# Patient Record
Sex: FEMALE
[De-identification: names, ages, dates, MRNs, and addresses within clinical notes are randomized per-mention and may not be internally consistent; named-entity substitution may affect disease eponyms.]

---

## 2021-04-20 ENCOUNTER — APPOINTMENT (OUTPATIENT)
Dept: OPHTHALMOLOGY | Facility: CLINIC | Age: 61
End: 2021-04-20

## 2022-03-15 ENCOUNTER — NON-APPOINTMENT (OUTPATIENT)
Age: 62
End: 2022-03-15

## 2022-03-15 ENCOUNTER — APPOINTMENT (OUTPATIENT)
Dept: OPHTHALMOLOGY | Facility: CLINIC | Age: 62
End: 2022-03-15
Payer: MEDICAID

## 2022-03-15 PROBLEM — Z00.00 ENCOUNTER FOR PREVENTIVE HEALTH EXAMINATION: Status: ACTIVE | Noted: 2022-03-15

## 2022-03-15 PROCEDURE — 92004 COMPRE OPH EXAM NEW PT 1/>: CPT

## 2022-03-15 PROCEDURE — 92020 GONIOSCOPY: CPT

## 2022-03-15 PROCEDURE — 92250 FUNDUS PHOTOGRAPHY W/I&R: CPT

## 2022-03-17 ENCOUNTER — APPOINTMENT (OUTPATIENT)
Dept: OPHTHALMOLOGY | Facility: CLINIC | Age: 62
End: 2022-03-17
Payer: MEDICAID

## 2022-03-17 ENCOUNTER — NON-APPOINTMENT (OUTPATIENT)
Age: 62
End: 2022-03-17

## 2022-03-17 PROCEDURE — 92133 CPTRZD OPH DX IMG PST SGM ON: CPT

## 2022-03-17 PROCEDURE — 92012 INTRM OPH EXAM EST PATIENT: CPT

## 2022-03-17 PROCEDURE — 76514 ECHO EXAM OF EYE THICKNESS: CPT

## 2022-03-17 PROCEDURE — 92083 EXTENDED VISUAL FIELD XM: CPT

## 2023-04-11 ENCOUNTER — APPOINTMENT (OUTPATIENT)
Dept: CARDIOLOGY | Facility: CLINIC | Age: 63
End: 2023-04-11

## 2023-04-24 ENCOUNTER — NON-APPOINTMENT (OUTPATIENT)
Age: 63
End: 2023-04-24

## 2023-04-24 ENCOUNTER — APPOINTMENT (OUTPATIENT)
Dept: CARDIOLOGY | Facility: CLINIC | Age: 63
End: 2023-04-24
Payer: MEDICAID

## 2023-04-24 VITALS
SYSTOLIC BLOOD PRESSURE: 151 MMHG | WEIGHT: 180.78 LBS | OXYGEN SATURATION: 99 % | DIASTOLIC BLOOD PRESSURE: 85 MMHG | HEART RATE: 82 BPM | BODY MASS INDEX: 30.12 KG/M2 | HEIGHT: 65 IN

## 2023-04-24 DIAGNOSIS — K21.9 GASTRO-ESOPHAGEAL REFLUX DISEASE W/OUT ESOPHAGITIS: ICD-10-CM

## 2023-04-24 DIAGNOSIS — Z82.49 FAMILY HISTORY OF ISCHEMIC HEART DISEASE AND OTHER DISEASES OF THE CIRCULATORY SYSTEM: ICD-10-CM

## 2023-04-24 DIAGNOSIS — R73.03 PREDIABETES.: ICD-10-CM

## 2023-04-24 DIAGNOSIS — Z78.9 OTHER SPECIFIED HEALTH STATUS: ICD-10-CM

## 2023-04-24 PROCEDURE — 99205 OFFICE O/P NEW HI 60 MIN: CPT | Mod: 25

## 2023-04-24 PROCEDURE — 93000 ELECTROCARDIOGRAM COMPLETE: CPT

## 2023-04-24 RX ORDER — ASPIRIN 81 MG/1
81 TABLET, CHEWABLE ORAL
Refills: 0 | Status: ACTIVE | COMMUNITY

## 2023-04-24 RX ORDER — CARVEDILOL 6.25 MG/1
6.25 TABLET, FILM COATED ORAL TWICE DAILY
Qty: 180 | Refills: 3 | Status: ACTIVE | COMMUNITY
Start: 2023-04-24 | End: 1900-01-01

## 2023-04-24 RX ORDER — PANTOPRAZOLE 40 MG/1
40 TABLET, DELAYED RELEASE ORAL
Qty: 30 | Refills: 1 | Status: ACTIVE | COMMUNITY

## 2023-04-24 RX ORDER — BISOPROLOL FUMARATE 5 MG/1
5 TABLET, FILM COATED ORAL
Refills: 0 | Status: COMPLETED | COMMUNITY
End: 2023-04-24

## 2023-06-03 ENCOUNTER — TRANSCRIPTION ENCOUNTER (OUTPATIENT)
Age: 63
End: 2023-06-03

## 2023-06-06 NOTE — HISTORY OF PRESENT ILLNESS
[FreeTextEntry1] : Ms. King id a 63 year-old woman with known CAD (LAD/RCA both PCI performed in [place]), HTN and HLD here to establish care.

## 2023-06-06 NOTE — DISCUSSION/SUMMARY
[FreeTextEntry1] : Plan:\par 1 Cardiac rehab referral\par 2. TTE - to establish baseline\par 3. determine exercise tolerance to ensure revasc was successful [EKG obtained to assist in diagnosis and management of assessed problem(s)] : EKG obtained to assist in diagnosis and management of assessed problem(s)

## 2023-07-25 ENCOUNTER — NON-APPOINTMENT (OUTPATIENT)
Age: 63
End: 2023-07-25

## 2023-07-25 ENCOUNTER — APPOINTMENT (OUTPATIENT)
Dept: CARDIOLOGY | Facility: CLINIC | Age: 63
End: 2023-07-25

## 2023-07-25 NOTE — HISTORY OF PRESENT ILLNESS
[Is Patient adherent with medication?] : patient is adherent with medication [Does patient monitor blood pressure at home?] : patient monitors blood pressure at home [Low sodium diet] : low sodium diet [Other diet strategies] : other diet strategies [Stress management techniques] : stress management techniques [Exercise] : exercise [Weight management] : weight management [Patient will verbalize factors contributing to improved blood pressure management] : Patient will verbalize factors contributing to improved blood pressure management [Patient will maintain blood pressure < 130/80 mmHg] : patient will maintain blood pressure < 130/80 mmHg [Medication Adherence] : medication adherence [Home monitoring of blood pressure] : home monitoring of blood pressure [Use of stress management techniques] : Use of stress management techniques [Diet changes including (healthy heart eating, less processed foods, low sodium diet)] : Diet changes including (healthy heart eating, less processed foods, low sodium diet) [Healthy weight (management maintenance)] : Healthy weight (management maintenance) [Halt the salt] : halt the salt [Guidelines for blood pressure management] : guidelines for blood pressure management [Define hypertension] : define hypertension [Signs and symptoms] : signs and symptoms [Role of lifestyle behaviors in blood pressure management] : role of lifestyle behaviors in blood pressure management [Medications] : medications [Exercise and blood pressure] : exercise and blood pressure [Food labels] : food labels [Sodium] : sodium [Processed food] : processed food [Height: ___] : Height: [unfilled] [Weight: ___ lbs] : Weight: [unfilled] lbs [Does patient monitor weight at home?] : Does patient monitor weight at home? Yes [Patient will lose 0.5 to 1 lb per week] : Patient will lose 0.5 to 1lb per week [Patient will weigh self daily] : patient will weigh self daily [Monitoring of weight at home and at cardiac rehabilitation] : Monitoring of weight at home and at cardiac rehabilitation [Individualized exercise program as developed at cardiac rehabilitation] : Individualized exercise program as developed at cardiac rehabilitation [Dietary strategies, including heart healthy eating, balanced with physical activity] : Dietary strategies, including heart healthy eating, balanced with physical activity [Patient will verbalize benefits of healthy weight management] : Patient will verbalize benefits of healthy weight management [Calories and healthy weight management] : Calories and healthy weight management [Exercise and diet] : exercise and diet [Weight gain and heart failure implications] : weight gain and heart failure implications [Role of lifestyle behaviors in weight management] : Role of lifestyle behaviors in weight management [Teach back utilized] : teach back utilized [Assessment] : Assessment [Action] : Action [Motivation/Desire] : motivation/desire [PCI/Stent] : PCI/stent [HLD] : HLD [Hypertension] : hypertension [Overweight/Obesity] : overweight/obesity [RPE: ____] : RPE: [unfilled]  [Cardiac Rehabilitation] : Cardiac Rehabilitation [___ Days per week] : [unfilled] days per week [>= 31 minutes per session] : >= 31 minutes per session [Target RPE: ___] : Target RPE: [unfilled] [Treadmill] : treadmill [Recumbent bike] : recumbent bike [Upright exercise bike] : upright exercise bike [BioStep] : BioStep [Elliptical] : elliptical [Upper body ergometer] : upper body ergometer [Stair Climber] : stair climber [Walking] : walking [Running] : running [Stretching/ROM exercises and balance exercises daily] : Stretching/ROM exercises and balance exercises daily [Will assess for musculoskeletal and other restrictions] : will assess for musculoskeletal and other restrictions [Angina with exercise] : no angina with exercise [Fall Risk] : no fall risk [FreeTextEntry6] : walking 40 mins per day [de-identified] : 7/25/23: Patient states that she is very active and walks approx 40-50 mins every day, [place] [Home] : at home, [unfilled] , at the time of the visit. [Verbal consent obtained from patient] : the patient, [unfilled] [Fully functional (bathing, dressing, toileting, transferring, walking, feeding)] : Fully functional (bathing, dressing, toileting, transferring, walking, feeding) [FreeTextEntry1] : Ms. King is a 63 year old female s/p PCI to both RCA and LAD on 2/16/23 in [place]. She has a pertinent history of HTN and HLD. States she was feeling well until February where she developed sudden onset midsternal chest pain with intractable vomiting while in the airport in [place]. Called EMS and was taken to hospital and emergently to cath lab for PCI to LAD via right radial approach. RCA was staged on 3/8/23.  Patient states she had an extensive CCU stay and initially struggled with hypotension post-procedure. LVEF was determined to be 40% while in [facility]. Patient then returned to the [facility] and saw Dr. Woodson to establish care. She states she is feeling well and is compliant with all of her medications. She is active and walks approximately 40-50 minutes per day, denies complaints of chest pain or dyspnea while exercising. She endorses a diet rich in whole grains and lean protein and demonstrates extensive knowledge regarding a heart healthy diet and the importance of staying active. 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 She is eager to begin cardiac rehab.

## 2023-07-25 NOTE — REVIEW OF SYSTEMS
[Fever] : no fever [Chills] : no chills [Fatigue] : no fatigue [Night Sweats] : no night sweats [Recent Change In Weight] : ~T no recent weight change [Vision Problems] : no vision problems [Hearing Loss] : no hearing loss [Chest Pain] : no chest pain [Palpitations] : no palpitations [Leg Claudication] : no leg claudication [Lower Ext Edema] : no lower extremity edema [Orthopnea] : no orthopnea [Paroxysmal Nocturnal Dyspnea] : no paroxysmal nocturnal dyspnea [Shortness Of Breath] : no shortness of breath [Wheezing] : no wheezing [Cough] : no cough [Dyspnea on Exertion] : no dyspnea on exertion [Abdominal Pain] : no abdominal pain [Vomiting] : no vomiting [Joint Pain] : no joint pain [Muscle Pain] : no muscle pain [Back Pain] : no back pain [Headache] : no headache [Dizziness] : no dizziness [Memory Loss] : no memory loss [Unsteady Walking] : no ataxia [Insomnia] : no insomnia [Anxiety] : no anxiety [Depression] : no depression

## 2023-07-25 NOTE — PLAN
[FreeTextEntry1] : Cardiac Rehabilitation Phase 2\par Focus assessment and physical exam at session #1\par ITP initiated- confer with Dr. Barros\par \par All questions answered. \par Welcome packet mailed.\par Start date: 11/8/23 11AM session\par We will reach out to patient if there is a sooner opening as she is eager to get started. \par

## 2023-07-25 NOTE — REASON FOR VISIT
[Assessment] : an assessment [FreeTextEntry1] : ITP to be reviewed and manually signed by Dr. Landis; ITP to be finalized at session #1; Clinical indication for cardiac rehabilitation: PCI [Intake Visit] : an intake visit

## 2023-07-25 NOTE — HISTORY OF PRESENT ILLNESS
[Is Patient adherent with medication?] : patient is adherent with medication [Does patient monitor blood pressure at home?] : patient monitors blood pressure at home [Low sodium diet] : low sodium diet [Other diet strategies] : other diet strategies [Stress management techniques] : stress management techniques [Exercise] : exercise [Weight management] : weight management [Patient will verbalize factors contributing to improved blood pressure management] : Patient will verbalize factors contributing to improved blood pressure management [Patient will maintain blood pressure < 130/80 mmHg] : patient will maintain blood pressure < 130/80 mmHg [Medication Adherence] : medication adherence [Home monitoring of blood pressure] : home monitoring of blood pressure [Use of stress management techniques] : Use of stress management techniques [Diet changes including (healthy heart eating, less processed foods, low sodium diet)] : Diet changes including (healthy heart eating, less processed foods, low sodium diet) [Healthy weight (management maintenance)] : Healthy weight (management maintenance) [Halt the salt] : halt the salt [Guidelines for blood pressure management] : guidelines for blood pressure management [Define hypertension] : define hypertension [Signs and symptoms] : signs and symptoms [Role of lifestyle behaviors in blood pressure management] : role of lifestyle behaviors in blood pressure management [Medications] : medications [Exercise and blood pressure] : exercise and blood pressure [Food labels] : food labels [Sodium] : sodium [Processed food] : processed food [Height: ___] : Height: [unfilled] [Weight: ___ lbs] : Weight: [unfilled] lbs [Does patient monitor weight at home?] : Does patient monitor weight at home? Yes [Patient will lose 0.5 to 1 lb per week] : Patient will lose 0.5 to 1lb per week [Patient will weigh self daily] : patient will weigh self daily [Monitoring of weight at home and at cardiac rehabilitation] : Monitoring of weight at home and at cardiac rehabilitation [Individualized exercise program as developed at cardiac rehabilitation] : Individualized exercise program as developed at cardiac rehabilitation [Dietary strategies, including heart healthy eating, balanced with physical activity] : Dietary strategies, including heart healthy eating, balanced with physical activity [Patient will verbalize benefits of healthy weight management] : Patient will verbalize benefits of healthy weight management [Calories and healthy weight management] : Calories and healthy weight management [Exercise and diet] : exercise and diet [Weight gain and heart failure implications] : weight gain and heart failure implications [Role of lifestyle behaviors in weight management] : Role of lifestyle behaviors in weight management [Teach back utilized] : teach back utilized [Assessment] : Assessment [Action] : Action [Motivation/Desire] : motivation/desire [PCI/Stent] : PCI/stent [HLD] : HLD [Hypertension] : hypertension [Overweight/Obesity] : overweight/obesity [RPE: ____] : RPE: [unfilled]  [Cardiac Rehabilitation] : Cardiac Rehabilitation [___ Days per week] : [unfilled] days per week [>= 31 minutes per session] : >= 31 minutes per session [Target RPE: ___] : Target RPE: [unfilled] [Treadmill] : treadmill [Recumbent bike] : recumbent bike [Upright exercise bike] : upright exercise bike [BioStep] : BioStep [Elliptical] : elliptical [Upper body ergometer] : upper body ergometer [Stair Climber] : stair climber [Walking] : walking [Running] : running [Stretching/ROM exercises and balance exercises daily] : Stretching/ROM exercises and balance exercises daily [Will assess for musculoskeletal and other restrictions] : will assess for musculoskeletal and other restrictions [Angina with exercise] : no angina with exercise [Fall Risk] : no fall risk [FreeTextEntry6] : walking 40 mins per day [de-identified] : 7/25/23: Patient states that she is very active and walks approx 40-50 mins every day, [place] [Home] : at home, [unfilled] , at the time of the visit. [Verbal consent obtained from patient] : the patient, [unfilled] [Fully functional (bathing, dressing, toileting, transferring, walking, feeding)] : Fully functional (bathing, dressing, toileting, transferring, walking, feeding) [FreeTextEntry1] : Ms. King is a 63 year old female s/p PCI to both RCA and LAD on 2/16/23 in [place]. She has a pertinent history of HTN and HLD. States she was feeling well until February where she developed sudden onset midsternal chest pain with intractable vomiting while in the airport in [place]. Called EMS and was taken to hospital and emergently to cath lab for PCI to LAD via right radial approach. RCA was staged on 3/8/23.  Patient states she had an extensive CCU stay and initially struggled with hypotension post-procedure. LVEF was determined to be 40% while in [facility]. Patient then returned to the [facility] and saw Dr. Woodson to establish care. She states she is feeling well and is compliant with all of her medications. She is active and walks approximately 40-50 minutes per day, denies complaints of chest pain or dyspnea while exercising. She endorses a diet rich in whole grains and lean protein and demonstrates extensive knowledge regarding a heart healthy diet and the importance of staying active. 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

## 2023-07-26 ENCOUNTER — APPOINTMENT (OUTPATIENT)
Dept: CARDIOLOGY | Facility: CLINIC | Age: 63
End: 2023-07-26
Payer: MEDICARE

## 2023-07-26 VITALS
DIASTOLIC BLOOD PRESSURE: 83 MMHG | HEIGHT: 65 IN | WEIGHT: 180.78 LBS | BODY MASS INDEX: 30.12 KG/M2 | SYSTOLIC BLOOD PRESSURE: 154 MMHG | HEART RATE: 67 BPM | OXYGEN SATURATION: 99 %

## 2023-07-26 LAB
ALBUMIN SERPL ELPH-MCNC: 4.5 G/DL
ALP BLD-CCNC: 77 U/L
ALT SERPL-CCNC: 16 U/L
ANION GAP SERPL CALC-SCNC: 13 MMOL/L
AST SERPL-CCNC: 17 U/L
BILIRUB SERPL-MCNC: 0.4 MG/DL
BUN SERPL-MCNC: 19 MG/DL
CALCIUM SERPL-MCNC: 9.5 MG/DL
CHLORIDE SERPL-SCNC: 104 MMOL/L
CHOLEST SERPL-MCNC: 101 MG/DL
CO2 SERPL-SCNC: 23 MMOL/L
CREAT SERPL-MCNC: 0.73 MG/DL
EGFR: 91 ML/MIN/1.73M2
ESTIMATED AVERAGE GLUCOSE: 148 MG/DL
GLUCOSE SERPL-MCNC: 126 MG/DL
HBA1C MFR BLD HPLC: 6.8 %
HDLC SERPL-MCNC: 54 MG/DL
LDLC SERPL CALC-MCNC: 28 MG/DL
NONHDLC SERPL-MCNC: 47 MG/DL
POTASSIUM SERPL-SCNC: 4.3 MMOL/L
PROT SERPL-MCNC: 7.6 G/DL
SODIUM SERPL-SCNC: 139 MMOL/L
TRIGL SERPL-MCNC: 101 MG/DL

## 2023-07-26 PROCEDURE — 99214 OFFICE O/P EST MOD 30 MIN: CPT

## 2023-07-26 PROCEDURE — 93000 ELECTROCARDIOGRAM COMPLETE: CPT

## 2023-07-26 RX ORDER — ROSUVASTATIN CALCIUM 20 MG/1
20 TABLET, FILM COATED ORAL
Qty: 90 | Refills: 3 | Status: ACTIVE | COMMUNITY
Start: 1900-01-01 | End: 1900-01-01

## 2023-07-26 RX ORDER — EPLERENONE 25 MG/1
25 TABLET, COATED ORAL
Qty: 90 | Refills: 3 | Status: ACTIVE | COMMUNITY
Start: 1900-01-01 | End: 1900-01-01

## 2023-07-26 RX ORDER — VALSARTAN 40 MG/1
40 TABLET, COATED ORAL DAILY
Qty: 45 | Refills: 3 | Status: ACTIVE | COMMUNITY
Start: 1900-01-01 | End: 1900-01-01

## 2023-07-27 ENCOUNTER — APPOINTMENT (OUTPATIENT)
Dept: OPHTHALMOLOGY | Facility: CLINIC | Age: 63
End: 2023-07-27

## 2023-08-28 ENCOUNTER — TRANSCRIPTION ENCOUNTER (OUTPATIENT)
Age: 63
End: 2023-08-28

## 2023-09-07 ENCOUNTER — APPOINTMENT (OUTPATIENT)
Dept: CARDIOLOGY | Facility: CLINIC | Age: 63
End: 2023-09-07
Payer: MEDICARE

## 2023-09-07 PROCEDURE — 93798 PHYS/QHP OP CAR RHAB W/ECG: CPT

## 2023-09-07 NOTE — REVIEW OF SYSTEMS
[Fever] : no fever [Chills] : no chills [Fatigue] : no fatigue [Night Sweats] : no night sweats [Recent Change In Weight] : ~T no recent weight change [Vision Problems] : no vision problems [Hearing Loss] : no hearing loss [Chest Pain] : no chest pain [Palpitations] : no palpitations [Leg Claudication] : no leg claudication [Lower Ext Edema] : no lower extremity edema [Orthopnea] : no orthopnea [Paroxysmal Nocturnal Dyspnea] : no paroxysmal nocturnal dyspnea [Shortness Of Breath] : no shortness of breath [Wheezing] : no wheezing [Cough] : no cough [Dyspnea on Exertion] : no dyspnea on exertion [Abdominal Pain] : no abdominal pain [Vomiting] : no vomiting [Joint Pain] : no joint pain [Muscle Pain] : no muscle pain [Back Pain] : no back pain [Itching] : no itching [Skin Rash] : no skin rash [Headache] : no headache [Dizziness] : no dizziness [Memory Loss] : no memory loss [Unsteady Walking] : no ataxia [Insomnia] : no insomnia [Anxiety] : no anxiety [Depression] : no depression [Easy Bruising] : no easy bruising

## 2023-09-07 NOTE — REASON FOR VISIT
[Assessment] : an assessment [FreeTextEntry1] : ITP to be reviewed and manually signed by Dr. Landis;; Clinical indication for cardiac rehabilitation: PCI [Intake Visit] : an intake visit

## 2023-09-07 NOTE — PLAN
[FreeTextEntry1] : Cardiac Rehabilitation Phase 2  ITP initiated- confer with Dr. Barros  All questions answered.   Will progress with new intensities and modalities as appropriate.

## 2023-09-07 NOTE — PHYSICAL EXAM
[No Acute Distress] : no acute distress [Well Nourished] : well nourished [Well-Appearing] : well-appearing [No JVD] : no jugular venous distention [Supple] : supple [No Respiratory Distress] : no respiratory distress  [No Accessory Muscle Use] : no accessory muscle use [Clear to Auscultation] : lungs were clear to auscultation bilaterally [Normal Rate] : normal rate  [Regular Rhythm] : with a regular rhythm [Normal S1, S2] : normal S1 and S2 [No Carotid Bruits] : no carotid bruits [No Edema] : there was no peripheral edema [No Extremity Clubbing/Cyanosis] : no extremity clubbing/cyanosis [Soft] : abdomen soft [Non Tender] : non-tender [Non-distended] : non-distended [Normal Bowel Sounds] : normal bowel sounds [Normal Anterior Cervical Nodes] : no anterior cervical lymphadenopathy [No CVA Tenderness] : no CVA  tenderness [No Spinal Tenderness] : no spinal tenderness [No Joint Swelling] : no joint swelling [Grossly Normal Strength/Tone] : grossly normal strength/tone [No Rash] : no rash [Coordination Grossly Intact] : coordination grossly intact [No Focal Deficits] : no focal deficits [Normal Gait] : normal gait [Speech Grossly Normal] : speech grossly normal [Normal Affect] : the affect was normal [Normal Insight/Judgement] : insight and judgment were intact

## 2023-09-07 NOTE — HISTORY OF PRESENT ILLNESS
[Does patient monitor blood pressure at home?] : patient monitors blood pressure at home [Low sodium diet] : low sodium diet [Other diet strategies] : other diet strategies [Stress management techniques] : stress management techniques [Exercise] : exercise [Weight management] : weight management [Patient will verbalize factors contributing to improved blood pressure management] : Patient will verbalize factors contributing to improved blood pressure management [Patient will maintain blood pressure < 130/80 mmHg] : patient will maintain blood pressure < 130/80 mmHg [Medication Adherence] : medication adherence [Home monitoring of blood pressure] : home monitoring of blood pressure [Use of stress management techniques] : Use of stress management techniques [Diet changes including (healthy heart eating, less processed foods, low sodium diet)] : Diet changes including (healthy heart eating, less processed foods, low sodium diet) [Healthy weight (management maintenance)] : Healthy weight (management maintenance) [Halt the salt] : halt the salt [Guidelines for blood pressure management] : guidelines for blood pressure management [Define hypertension] : define hypertension [Signs and symptoms] : signs and symptoms [Role of lifestyle behaviors in blood pressure management] : role of lifestyle behaviors in blood pressure management [Medications] : medications [Exercise and blood pressure] : exercise and blood pressure [Food labels] : food labels [Processed food] : processed food [Height: ___] : Height: [unfilled] [Weight: ___ lbs] : Weight: [unfilled] lbs [Does patient monitor weight at home?] : Does patient monitor weight at home? Yes [Patient will lose 0.5 to 1 lb per week] : Patient will lose 0.5 to 1lb per week [Patient will weigh self daily] : patient will weigh self daily [Monitoring of weight at home and at cardiac rehabilitation] : Monitoring of weight at home and at cardiac rehabilitation [Individualized exercise program as developed at cardiac rehabilitation] : Individualized exercise program as developed at cardiac rehabilitation [Patient will verbalize benefits of healthy weight management] : Patient will verbalize benefits of healthy weight management [Dietary strategies, including heart healthy eating, balanced with physical activity] : Dietary strategies, including heart healthy eating, balanced with physical activity [Calories and healthy weight management] : Calories and healthy weight management [Exercise and diet] : exercise and diet [Weight gain and heart failure implications] : weight gain and heart failure implications [Role of lifestyle behaviors in weight management] : Role of lifestyle behaviors in weight management [Motivation/Desire] : motivation/desire [PCI/Stent] : PCI/stent [HLD] : HLD [Overweight/Obesity] : overweight/obesity [Angina with exercise] : no angina with exercise [Fall Risk] : no fall risk [BP: ____ mmHg] : BP: [unfilled] mmHg [HR: ____ bpm] : BP: [unfilled] bpm [O2sat: ____ %] : O2sat: [unfilled] % [RPE: ____] : RPE: [unfilled]  [METS: ____] : METS: [unfilled]  [Cardiac Rehabilitation] : Cardiac Rehabilitation [___ Days per week] : [unfilled] days per week [>= 31 minutes per session] : >= 31 minutes per session [Target RPE: ___] : Target RPE: [unfilled] [Treadmill] : treadmill [Recumbent bike] : recumbent bike [Upright exercise bike] : upright exercise bike [BioStep] : BioStep [Elliptical] : elliptical [Upper body ergometer] : upper body ergometer [Stair Climber] : stair climber [Walking] : walking [Running] : running [Stretching/ROM exercises and balance exercises daily] : Stretching/ROM exercises and balance exercises daily [Will assess for musculoskeletal and other restrictions] : will assess for musculoskeletal and other restrictions [Perform aerobic activity for ___ consecutive minutes] : perform aerobic activity for [unfilled] consecutive minutes [To start resistance training] : to start resistance training [To return to my previous hobbies and activities] : to return to my previous hobbies and activities  [Equipment Orientation/Safety] : equipment orientation/safety [Exercise Benefits/Guidelines education] : exercise benefits/guidelines education [Individualized Exercise Rx] : individualized exercise Rx [Signs/Symptoms to report] : signs/symptoms to report [Hemodynamic responses] : hemodynamic responses [Home exercise] : home exercise [Patient verbalizes understanding of exercise education all questions answered] : Patient verbalizes understanding of exercise education all questions answered [Teach back utilized] : teach back utilized [Return demonstration] : return demonstration [de-identified] : 7/25/23: Patient states that she is very active and walks approx 40-50 mins every day.  9/7/23: Session 1- Tolerated biostep and recumbent bike. Hesitant to walk on TM, but will provide support and encouragement  RB METS- 2.9, BS METS 2.4 [PHQ-9: ___] : PHQ-9: [unfilled] [[facility]: ___] : [facility] total: [unfilled] [Feelings Score: ___] : Feelings Score: [unfilled] [Physical Fitness Score: ____] : Physical Fitness Score: [unfilled] [Daily Activities Score: ___] : Daily Activities Score: [unfilled] [Social Activities Score: ___] : Social Activities Score: [unfilled] [Pain Score: ___] : Pain Score: [unfilled] [Overall Health Score: ___] : Overall Health Score: [unfilled] [Change in Health Score: ___] : Change in Health Score: [unfilled] [Quality of Life Score: ___] : Quality of Life Score: [unfilled] [Social Support Score: ___] : Social Support Score: [unfilled] [Has the patient given CR team permission to speak with the emergency  about the patient?] : Has the patient given CR team permission to speak with the emergency  about the patient? Yes [Patient will include healthy emotional coping practices in everyday life, such as: ____] : Patient will include healthy emotional coping practices in everyday life, such as [unfilled] [Patient will increase use of healthy stress management techniques] : Patient will increase use of healthy stress management techniques [Breathing exercises] : breathing exercises [Self-reports of improved psychosocial well-being] : Self-reports of improved psychosocial well-being [Discuss overview of emotional health supportive modalities] : Discuss overview of emotional health supportive modalities [Mindfulness] : mindfulness [Relaxation breathing techniques] : relaxation breathing techniques [Stress management] : stress management [Community support] : community support [FreeTextEntry9] : Session 1: Patient seems in good spirits, nervous in general about exercising considering her recent MI. Provided with emotional support. States she has good support at home, and is happy with her cardiologist Dr. Woodson so far.  [Assessment] : Assessment [Action] : Action [Rate your plate score: ____] : Rate your plate score: [unfilled] [Hypertension] : Hypertension [Diabetes] : Diabetes [Heart Failure] : Heart Failure [Overweight] : Overweight [Sugar] : sugar [Sodium] : sodium [Cholesterol] : cholesterol [Trans fats/saturated fats] : trans fats/saturated fats [] : no [Is patient on medication for hyperlipidemia?] : Is patient on medication for hyperlipidemia? Yes [Is Patient adherent with medication?] : patient is adherent with medication [Self] : self [Significant other] : significant other [Family] : family [Patient will include healthy diet pattern approaches to healthy eating] : Patient will include healthy diet pattern approaches to healthy eating [Patient will commit to reducing or discontinuing alcohol use] : Patient will commit to reducing or discontinuing alcohol use [Self-reports of improved health eating patterns] : Self-reports of improved health eating patterns [Discuss an overview of healthy eating plan] : Discuss an overview of healthy eating plan [DASH Diet] : DASH diet [CareNotes written material provided] : CareNotes written material provided [Yes, continue with nutrition plan] : Yes, continue with nutrition plan [FreeTextEntry6] : rice; sodium content [FreeTextEntry7] : Will improve RYP score by end of CR [FreeTextEntry8] : 9/7/23: Session 1- Patient eats whole eggs frequently, will educate regarding the cholesterol content and see if she is interested in seeing a dietician to discuss egg alternatives [FreeTextEntry1] : Ms. King is a 63 year old female s/p PCI to both RCA and LAD on 2/16/23 in [place]. She has a pertinent history of HTN and HLD. States she was feeling well until February where she developed sudden onset midsternal chest pain with intractable vomiting while in the airport in [place]. Called EMS and was taken to hospital and emergently to cath lab for PCI to LAD via right radial approach. RCA was staged on 3/8/23.  Patient states she had an extensive CCU stay and initially struggled with hypotension post-procedure. LVEF was determined to be 40% while in [facility]. Patient then returned to the [facility] and saw Dr. Woodson to establish care. She states she is feeling well and is compliant with all of her medications. She is active and walks approximately 40-50 minutes per day, denies complaints of chest pain or dyspnea while exercising. She endorses a diet rich in whole grains and lean protein and demonstrates extensive knowledge regarding a heart healthy diet and the importance of staying active. 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 She is eager to begin cardiac rehab.   9/7/23 Session 1: Patient presents today for first CR session, states she is feeling well and is without focal complaints. Patient nervous to begin exercise in light of her recent MI, given support and encouragement. Patient  tolerated all introduced modalities well and without complaints.  [Fully functional (bathing, dressing, toileting, transferring, walking, feeding)] : Fully functional (bathing, dressing, toileting, transferring, walking, feeding)

## 2023-09-07 NOTE — HISTORY OF PRESENT ILLNESS
[Does patient monitor blood pressure at home?] : patient monitors blood pressure at home [Low sodium diet] : low sodium diet [Other diet strategies] : other diet strategies [Stress management techniques] : stress management techniques [Exercise] : exercise [Weight management] : weight management [Patient will verbalize factors contributing to improved blood pressure management] : Patient will verbalize factors contributing to improved blood pressure management [Patient will maintain blood pressure < 130/80 mmHg] : patient will maintain blood pressure < 130/80 mmHg [Medication Adherence] : medication adherence [Home monitoring of blood pressure] : home monitoring of blood pressure [Use of stress management techniques] : Use of stress management techniques [Diet changes including (healthy heart eating, less processed foods, low sodium diet)] : Diet changes including (healthy heart eating, less processed foods, low sodium diet) [Healthy weight (management maintenance)] : Healthy weight (management maintenance) [Halt the salt] : halt the salt [Guidelines for blood pressure management] : guidelines for blood pressure management [Define hypertension] : define hypertension [Signs and symptoms] : signs and symptoms [Role of lifestyle behaviors in blood pressure management] : role of lifestyle behaviors in blood pressure management [Medications] : medications [Exercise and blood pressure] : exercise and blood pressure [Food labels] : food labels [Processed food] : processed food [Height: ___] : Height: [unfilled] [Weight: ___ lbs] : Weight: [unfilled] lbs [Does patient monitor weight at home?] : Does patient monitor weight at home? Yes [Patient will lose 0.5 to 1 lb per week] : Patient will lose 0.5 to 1lb per week [Patient will weigh self daily] : patient will weigh self daily [Monitoring of weight at home and at cardiac rehabilitation] : Monitoring of weight at home and at cardiac rehabilitation [Individualized exercise program as developed at cardiac rehabilitation] : Individualized exercise program as developed at cardiac rehabilitation [Patient will verbalize benefits of healthy weight management] : Patient will verbalize benefits of healthy weight management [Dietary strategies, including heart healthy eating, balanced with physical activity] : Dietary strategies, including heart healthy eating, balanced with physical activity [Calories and healthy weight management] : Calories and healthy weight management [Exercise and diet] : exercise and diet [Weight gain and heart failure implications] : weight gain and heart failure implications [Role of lifestyle behaviors in weight management] : Role of lifestyle behaviors in weight management [Motivation/Desire] : motivation/desire [PCI/Stent] : PCI/stent [HLD] : HLD [Overweight/Obesity] : overweight/obesity [Angina with exercise] : no angina with exercise [Fall Risk] : no fall risk [BP: ____ mmHg] : BP: [unfilled] mmHg [HR: ____ bpm] : BP: [unfilled] bpm [O2sat: ____ %] : O2sat: [unfilled] % [RPE: ____] : RPE: [unfilled]  [METS: ____] : METS: [unfilled]  [Cardiac Rehabilitation] : Cardiac Rehabilitation [___ Days per week] : [unfilled] days per week [>= 31 minutes per session] : >= 31 minutes per session [Target RPE: ___] : Target RPE: [unfilled] [Treadmill] : treadmill [Recumbent bike] : recumbent bike [Upright exercise bike] : upright exercise bike [BioStep] : BioStep [Elliptical] : elliptical [Upper body ergometer] : upper body ergometer [Stair Climber] : stair climber [Walking] : walking [Running] : running [Stretching/ROM exercises and balance exercises daily] : Stretching/ROM exercises and balance exercises daily [Will assess for musculoskeletal and other restrictions] : will assess for musculoskeletal and other restrictions [Perform aerobic activity for ___ consecutive minutes] : perform aerobic activity for [unfilled] consecutive minutes [To start resistance training] : to start resistance training [To return to my previous hobbies and activities] : to return to my previous hobbies and activities  [Equipment Orientation/Safety] : equipment orientation/safety [Exercise Benefits/Guidelines education] : exercise benefits/guidelines education [Individualized Exercise Rx] : individualized exercise Rx [Signs/Symptoms to report] : signs/symptoms to report [Hemodynamic responses] : hemodynamic responses [Home exercise] : home exercise [Patient verbalizes understanding of exercise education all questions answered] : Patient verbalizes understanding of exercise education all questions answered [Teach back utilized] : teach back utilized [Return demonstration] : return demonstration [de-identified] : 7/25/23: Patient states that she is very active and walks approx 40-50 mins every day.  9/7/23: Session 1- Tolerated biostep and recumbent bike. Hesitant to walk on TM, but will provide support and encouragement  RB METS- 2.9, BS METS 2.4 [PHQ-9: ___] : PHQ-9: [unfilled] [[facility]: [facility]] : [facility] total: [unfilled] [Feelings Score: ___] : Feelings Score: [unfilled] [Physical Fitness Score: ____] : Physical Fitness Score: [unfilled] [Daily Activities Score: ___] : Daily Activities Score: [unfilled] [Social Activities Score: ___] : Social Activities Score: [unfilled] [Pain Score: ___] : Pain Score: [unfilled] [Overall Health Score: ___] : Overall Health Score: [unfilled] [Change in Health Score: ___] : Change in Health Score: [unfilled] [Quality of Life Score: ___] : Quality of Life Score: [unfilled] [Social Support Score: ___] : Social Support Score: [unfilled] [Has the patient given CR team permission to speak with the emergency  about the patient?] : Has the patient given CR team permission to speak with the emergency  about the patient? Yes [Patient will include healthy emotional coping practices in everyday life, such as: ____] : Patient will include healthy emotional coping practices in everyday life, such as [unfilled] [Patient will increase use of healthy stress management techniques] : Patient will increase use of healthy stress management techniques [Breathing exercises] : breathing exercises [Self-reports of improved psychosocial well-being] : Self-reports of improved psychosocial well-being [Discuss overview of emotional health supportive modalities] : Discuss overview of emotional health supportive modalities [Mindfulness] : mindfulness [Relaxation breathing techniques] : relaxation breathing techniques [Stress management] : stress management [Community support] : community support [FreeTextEntry9] : Session 1: Patient seems in good spirits, nervous in general about exercising considering her recent MI. Provided with emotional support. States she has good support at home, and is happy with her cardiologist Dr. Woodson so far.  [Assessment] : Assessment [Action] : Action [Rate your plate score: ____] : Rate your plate score: [unfilled] [Hypertension] : Hypertension [Diabetes] : Diabetes [Heart Failure] : Heart Failure [Overweight] : Overweight [Sugar] : sugar [Sodium] : sodium [Cholesterol] : cholesterol [Trans fats/saturated fats] : trans fats/saturated fats [] : no [Is patient on medication for hyperlipidemia?] : Is patient on medication for hyperlipidemia? Yes [Is Patient adherent with medication?] : patient is adherent with medication [Self] : self [Significant other] : significant other [Family] : family [Patient will include healthy diet pattern approaches to healthy eating] : Patient will include healthy diet pattern approaches to healthy eating [Patient will commit to reducing or discontinuing alcohol use] : Patient will commit to reducing or discontinuing alcohol use [Self-reports of improved health eating patterns] : Self-reports of improved health eating patterns [Discuss an overview of healthy eating plan] : Discuss an overview of healthy eating plan [DASH Diet] : DASH diet [CareNotes written material provided] : CareNotes written material provided [Yes, continue with nutrition plan] : Yes, continue with nutrition plan [FreeTextEntry6] : rice; sodium content [FreeTextEntry7] : Will improve RYP score by end of CR [FreeTextEntry8] : 9/7/23: Session 1- Patient eats whole eggs frequently, will educate regarding the cholesterol content and see if she is interested in seeing a dietician to discuss egg alternatives [FreeTextEntry1] : Ms. King is a 63 year old female s/p PCI to both RCA and LAD on 2/16/23 in [place]. She has a pertinent history of HTN and HLD. States she was feeling well until February where she developed sudden onset midsternal chest pain with intractable vomiting while in the airport in [place]. Called EMS and was taken to hospital and emergently to cath lab for PCI to LAD via right radial approach. RCA was staged on 3/8/23.  Patient states she had an extensive CCU stay and initially struggled with hypotension post-procedure. LVEF was determined to be 40% while in [facility]. Patient then returned to the [facility] and saw Dr. Woodson to establish care. She states she is feeling well and is compliant with all of her medications. She is active and walks approximately 40-50 minutes per day, denies complaints of chest pain or dyspnea while exercising. She endorses a diet rich in whole grains and lean protein and demonstrates extensive knowledge regarding a heart healthy diet and the importance of staying active. 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   9/7/23 Session 1: Patient presents today for first CR session, states she is feeling well and is without focal complaints. Patient nervous to begin exercise in light of her recent MI, given support and encouragement. Patient  tolerated all introduced modalities well and without complaints.  [Fully functional (bathing, dressing, toileting, transferring, walking, feeding)] : Fully functional (bathing, dressing, toileting, transferring, walking, feeding)

## 2023-09-11 ENCOUNTER — APPOINTMENT (OUTPATIENT)
Dept: CARDIOLOGY | Facility: CLINIC | Age: 63
End: 2023-09-11
Payer: MEDICARE

## 2023-09-11 PROCEDURE — 93798 PHYS/QHP OP CAR RHAB W/ECG: CPT

## 2023-09-13 ENCOUNTER — APPOINTMENT (OUTPATIENT)
Dept: CARDIOLOGY | Facility: CLINIC | Age: 63
End: 2023-09-13
Payer: MEDICARE

## 2023-09-13 PROCEDURE — 93797 PHYS/QHP OP CAR RHAB WO ECG: CPT

## 2023-09-14 ENCOUNTER — APPOINTMENT (OUTPATIENT)
Dept: CARDIOLOGY | Facility: CLINIC | Age: 63
End: 2023-09-14
Payer: MEDICARE

## 2023-09-14 PROCEDURE — 93797 PHYS/QHP OP CAR RHAB WO ECG: CPT

## 2023-09-18 ENCOUNTER — APPOINTMENT (OUTPATIENT)
Dept: CARDIOLOGY | Facility: CLINIC | Age: 63
End: 2023-09-18
Payer: MEDICARE

## 2023-09-18 PROCEDURE — 93797 PHYS/QHP OP CAR RHAB WO ECG: CPT

## 2023-09-20 ENCOUNTER — APPOINTMENT (OUTPATIENT)
Dept: CARDIOLOGY | Facility: CLINIC | Age: 63
End: 2023-09-20
Payer: MEDICARE

## 2023-09-20 PROCEDURE — 93797 PHYS/QHP OP CAR RHAB WO ECG: CPT

## 2023-09-21 ENCOUNTER — APPOINTMENT (OUTPATIENT)
Dept: CARDIOLOGY | Facility: CLINIC | Age: 63
End: 2023-09-21
Payer: MEDICARE

## 2023-09-21 PROCEDURE — 93797 PHYS/QHP OP CAR RHAB WO ECG: CPT

## 2023-09-25 ENCOUNTER — APPOINTMENT (OUTPATIENT)
Dept: CARDIOLOGY | Facility: CLINIC | Age: 63
End: 2023-09-25
Payer: MEDICARE

## 2023-09-25 PROCEDURE — 93797 PHYS/QHP OP CAR RHAB WO ECG: CPT

## 2023-09-27 ENCOUNTER — APPOINTMENT (OUTPATIENT)
Dept: CARDIOLOGY | Facility: CLINIC | Age: 63
End: 2023-09-27
Payer: MEDICARE

## 2023-09-27 PROCEDURE — 93797 PHYS/QHP OP CAR RHAB WO ECG: CPT

## 2023-09-28 ENCOUNTER — APPOINTMENT (OUTPATIENT)
Dept: CARDIOLOGY | Facility: CLINIC | Age: 63
End: 2023-09-28
Payer: MEDICARE

## 2023-09-28 PROCEDURE — 93797 PHYS/QHP OP CAR RHAB WO ECG: CPT

## 2023-10-02 ENCOUNTER — APPOINTMENT (OUTPATIENT)
Dept: CARDIOLOGY | Facility: CLINIC | Age: 63
End: 2023-10-02
Payer: MEDICARE

## 2023-10-02 PROCEDURE — 93797 PHYS/QHP OP CAR RHAB WO ECG: CPT

## 2023-10-04 ENCOUNTER — APPOINTMENT (OUTPATIENT)
Dept: CARDIOLOGY | Facility: CLINIC | Age: 63
End: 2023-10-04
Payer: MEDICARE

## 2023-10-04 PROCEDURE — 93797 PHYS/QHP OP CAR RHAB WO ECG: CPT

## 2023-10-05 ENCOUNTER — APPOINTMENT (OUTPATIENT)
Dept: CARDIOLOGY | Facility: CLINIC | Age: 63
End: 2023-10-05
Payer: MEDICARE

## 2023-10-05 PROCEDURE — 93797 PHYS/QHP OP CAR RHAB WO ECG: CPT

## 2023-10-07 ENCOUNTER — NON-APPOINTMENT (OUTPATIENT)
Age: 63
End: 2023-10-07

## 2023-10-09 ENCOUNTER — APPOINTMENT (OUTPATIENT)
Dept: CARDIOLOGY | Facility: CLINIC | Age: 63
End: 2023-10-09
Payer: MEDICARE

## 2023-10-09 PROCEDURE — 93798 PHYS/QHP OP CAR RHAB W/ECG: CPT

## 2023-10-11 ENCOUNTER — APPOINTMENT (OUTPATIENT)
Dept: CARDIOLOGY | Facility: CLINIC | Age: 63
End: 2023-10-11
Payer: MEDICARE

## 2023-10-11 PROCEDURE — 93797 PHYS/QHP OP CAR RHAB WO ECG: CPT

## 2023-10-12 ENCOUNTER — APPOINTMENT (OUTPATIENT)
Dept: CARDIOLOGY | Facility: CLINIC | Age: 63
End: 2023-10-12
Payer: MEDICARE

## 2023-10-12 PROCEDURE — 93797 PHYS/QHP OP CAR RHAB WO ECG: CPT

## 2023-10-16 ENCOUNTER — APPOINTMENT (OUTPATIENT)
Dept: CARDIOLOGY | Facility: CLINIC | Age: 63
End: 2023-10-16
Payer: MEDICARE

## 2023-10-16 PROCEDURE — 93797 PHYS/QHP OP CAR RHAB WO ECG: CPT

## 2023-10-18 ENCOUNTER — APPOINTMENT (OUTPATIENT)
Dept: CARDIOLOGY | Facility: CLINIC | Age: 63
End: 2023-10-18
Payer: MEDICARE

## 2023-10-18 PROCEDURE — 93797 PHYS/QHP OP CAR RHAB WO ECG: CPT

## 2023-10-19 ENCOUNTER — APPOINTMENT (OUTPATIENT)
Dept: CARDIOLOGY | Facility: CLINIC | Age: 63
End: 2023-10-19
Payer: MEDICARE

## 2023-10-19 PROCEDURE — 93797 PHYS/QHP OP CAR RHAB WO ECG: CPT

## 2023-10-23 ENCOUNTER — APPOINTMENT (OUTPATIENT)
Dept: CARDIOLOGY | Facility: CLINIC | Age: 63
End: 2023-10-23
Payer: MEDICARE

## 2023-10-23 PROCEDURE — 93797 PHYS/QHP OP CAR RHAB WO ECG: CPT

## 2023-10-25 ENCOUNTER — APPOINTMENT (OUTPATIENT)
Dept: CARDIOLOGY | Facility: CLINIC | Age: 63
End: 2023-10-25
Payer: MEDICARE

## 2023-10-25 PROCEDURE — 93797 PHYS/QHP OP CAR RHAB WO ECG: CPT

## 2023-10-26 ENCOUNTER — APPOINTMENT (OUTPATIENT)
Dept: CARDIOLOGY | Facility: CLINIC | Age: 63
End: 2023-10-26
Payer: MEDICARE

## 2023-10-26 PROCEDURE — 93797 PHYS/QHP OP CAR RHAB WO ECG: CPT

## 2023-10-30 ENCOUNTER — APPOINTMENT (OUTPATIENT)
Dept: CARDIOLOGY | Facility: CLINIC | Age: 63
End: 2023-10-30
Payer: MEDICARE

## 2023-10-30 PROCEDURE — 93797 PHYS/QHP OP CAR RHAB WO ECG: CPT

## 2023-11-01 ENCOUNTER — APPOINTMENT (OUTPATIENT)
Dept: CARDIOLOGY | Facility: CLINIC | Age: 63
End: 2023-11-01
Payer: MEDICARE

## 2023-11-01 PROCEDURE — 93797 PHYS/QHP OP CAR RHAB WO ECG: CPT

## 2023-11-02 ENCOUNTER — APPOINTMENT (OUTPATIENT)
Dept: CARDIOLOGY | Facility: CLINIC | Age: 63
End: 2023-11-02
Payer: MEDICARE

## 2023-11-02 PROCEDURE — 93797 PHYS/QHP OP CAR RHAB WO ECG: CPT

## 2023-11-06 ENCOUNTER — NON-APPOINTMENT (OUTPATIENT)
Age: 63
End: 2023-11-06

## 2023-11-06 ENCOUNTER — APPOINTMENT (OUTPATIENT)
Dept: CARDIOLOGY | Facility: CLINIC | Age: 63
End: 2023-11-06
Payer: MEDICARE

## 2023-11-06 PROCEDURE — 93797 PHYS/QHP OP CAR RHAB WO ECG: CPT

## 2023-11-08 ENCOUNTER — APPOINTMENT (OUTPATIENT)
Dept: CARDIOLOGY | Facility: CLINIC | Age: 63
End: 2023-11-08
Payer: MEDICARE

## 2023-11-08 PROCEDURE — 93797 PHYS/QHP OP CAR RHAB WO ECG: CPT

## 2023-11-09 ENCOUNTER — APPOINTMENT (OUTPATIENT)
Dept: CARDIOLOGY | Facility: CLINIC | Age: 63
End: 2023-11-09
Payer: MEDICARE

## 2023-11-09 PROCEDURE — 93797 PHYS/QHP OP CAR RHAB WO ECG: CPT

## 2023-11-13 ENCOUNTER — APPOINTMENT (OUTPATIENT)
Dept: CARDIOLOGY | Facility: CLINIC | Age: 63
End: 2023-11-13
Payer: MEDICARE

## 2023-11-13 PROCEDURE — 93797 PHYS/QHP OP CAR RHAB WO ECG: CPT

## 2023-11-15 ENCOUNTER — APPOINTMENT (OUTPATIENT)
Dept: CARDIOLOGY | Facility: CLINIC | Age: 63
End: 2023-11-15
Payer: MEDICARE

## 2023-11-15 VITALS
WEIGHT: 176 LBS | DIASTOLIC BLOOD PRESSURE: 83 MMHG | BODY MASS INDEX: 29.32 KG/M2 | SYSTOLIC BLOOD PRESSURE: 122 MMHG | OXYGEN SATURATION: 98 % | HEART RATE: 65 BPM | HEIGHT: 65 IN

## 2023-11-15 PROCEDURE — 99214 OFFICE O/P EST MOD 30 MIN: CPT | Mod: 25

## 2023-11-15 PROCEDURE — 93000 ELECTROCARDIOGRAM COMPLETE: CPT

## 2023-11-20 ENCOUNTER — APPOINTMENT (OUTPATIENT)
Dept: CARDIOLOGY | Facility: CLINIC | Age: 63
End: 2023-11-20
Payer: MEDICARE

## 2023-11-20 PROCEDURE — 93797 PHYS/QHP OP CAR RHAB WO ECG: CPT

## 2023-11-22 ENCOUNTER — APPOINTMENT (OUTPATIENT)
Dept: CARDIOLOGY | Facility: CLINIC | Age: 63
End: 2023-11-22
Payer: MEDICARE

## 2023-11-22 PROCEDURE — 93797 PHYS/QHP OP CAR RHAB WO ECG: CPT

## 2023-11-27 ENCOUNTER — APPOINTMENT (OUTPATIENT)
Dept: CARDIOLOGY | Facility: CLINIC | Age: 63
End: 2023-11-27
Payer: MEDICARE

## 2023-11-27 PROCEDURE — 93797 PHYS/QHP OP CAR RHAB WO ECG: CPT

## 2023-11-29 ENCOUNTER — APPOINTMENT (OUTPATIENT)
Dept: CARDIOLOGY | Facility: CLINIC | Age: 63
End: 2023-11-29
Payer: MEDICARE

## 2023-11-29 PROCEDURE — 93797 PHYS/QHP OP CAR RHAB WO ECG: CPT

## 2023-11-30 ENCOUNTER — APPOINTMENT (OUTPATIENT)
Dept: CARDIOLOGY | Facility: CLINIC | Age: 63
End: 2023-11-30
Payer: MEDICARE

## 2023-11-30 PROCEDURE — 93797 PHYS/QHP OP CAR RHAB WO ECG: CPT

## 2023-12-04 ENCOUNTER — APPOINTMENT (OUTPATIENT)
Dept: CARDIOLOGY | Facility: CLINIC | Age: 63
End: 2023-12-04
Payer: MEDICARE

## 2023-12-04 PROCEDURE — 93797 PHYS/QHP OP CAR RHAB WO ECG: CPT

## 2023-12-05 ENCOUNTER — APPOINTMENT (OUTPATIENT)
Dept: OPHTHALMOLOGY | Facility: CLINIC | Age: 63
End: 2023-12-05
Payer: MEDICARE

## 2023-12-05 ENCOUNTER — NON-APPOINTMENT (OUTPATIENT)
Age: 63
End: 2023-12-05

## 2023-12-05 PROCEDURE — 92133 CPTRZD OPH DX IMG PST SGM ON: CPT

## 2023-12-05 PROCEDURE — 92014 COMPRE OPH EXAM EST PT 1/>: CPT | Mod: 25

## 2023-12-06 ENCOUNTER — APPOINTMENT (OUTPATIENT)
Dept: CARDIOLOGY | Facility: CLINIC | Age: 63
End: 2023-12-06
Payer: MEDICARE

## 2023-12-06 PROCEDURE — 93797 PHYS/QHP OP CAR RHAB WO ECG: CPT

## 2024-01-24 ENCOUNTER — APPOINTMENT (OUTPATIENT)
Dept: CARDIOLOGY | Facility: CLINIC | Age: 64
End: 2024-01-24

## 2024-02-26 ENCOUNTER — APPOINTMENT (OUTPATIENT)
Dept: CARDIOLOGY | Facility: CLINIC | Age: 64
End: 2024-02-26
Payer: MEDICARE

## 2024-02-26 VITALS
HEART RATE: 63 BPM | BODY MASS INDEX: 29.75 KG/M2 | OXYGEN SATURATION: 98 % | HEIGHT: 65 IN | DIASTOLIC BLOOD PRESSURE: 75 MMHG | SYSTOLIC BLOOD PRESSURE: 125 MMHG | WEIGHT: 178.57 LBS

## 2024-02-26 PROCEDURE — 93000 ELECTROCARDIOGRAM COMPLETE: CPT

## 2024-02-26 PROCEDURE — G2211 COMPLEX E/M VISIT ADD ON: CPT | Mod: NC,1L

## 2024-02-26 PROCEDURE — 99214 OFFICE O/P EST MOD 30 MIN: CPT | Mod: 25

## 2024-02-28 RX ORDER — DAPAGLIFLOZIN 5 MG/1
5 TABLET, FILM COATED ORAL
Qty: 90 | Refills: 1 | Status: ACTIVE | COMMUNITY
Start: 1900-01-01 | End: 1900-01-01

## 2024-03-16 ENCOUNTER — APPOINTMENT (OUTPATIENT)
Dept: CARDIOLOGY | Facility: CLINIC | Age: 64
End: 2024-03-16
Payer: MEDICARE

## 2024-03-16 PROCEDURE — 93306 TTE W/DOPPLER COMPLETE: CPT

## 2024-05-29 ENCOUNTER — APPOINTMENT (OUTPATIENT)
Dept: CARDIOLOGY | Facility: CLINIC | Age: 64
End: 2024-05-29
Payer: MEDICARE

## 2024-05-29 VITALS — DIASTOLIC BLOOD PRESSURE: 93 MMHG | OXYGEN SATURATION: 97 % | SYSTOLIC BLOOD PRESSURE: 153 MMHG | HEART RATE: 67 BPM

## 2024-05-29 DIAGNOSIS — I25.10 ATHEROSCLEROTIC HEART DISEASE OF NATIVE CORONARY ARTERY W/OUT ANGINA PECTORIS: ICD-10-CM

## 2024-05-29 DIAGNOSIS — I10 ESSENTIAL (PRIMARY) HYPERTENSION: ICD-10-CM

## 2024-05-29 DIAGNOSIS — R07.9 CHEST PAIN, UNSPECIFIED: ICD-10-CM

## 2024-05-29 PROCEDURE — 93000 ELECTROCARDIOGRAM COMPLETE: CPT

## 2024-05-29 PROCEDURE — 99214 OFFICE O/P EST MOD 30 MIN: CPT | Mod: 25

## 2024-05-29 PROCEDURE — G2211 COMPLEX E/M VISIT ADD ON: CPT | Mod: NC

## 2024-05-30 LAB
ALBUMIN SERPL ELPH-MCNC: 4.3 G/DL
ALP BLD-CCNC: 83 U/L
ALT SERPL-CCNC: 16 U/L
ANION GAP SERPL CALC-SCNC: 14 MMOL/L
AST SERPL-CCNC: 16 U/L
BILIRUB SERPL-MCNC: 0.3 MG/DL
BUN SERPL-MCNC: 24 MG/DL
CALCIUM SERPL-MCNC: 9.3 MG/DL
CHLORIDE SERPL-SCNC: 104 MMOL/L
CO2 SERPL-SCNC: 20 MMOL/L
CREAT SERPL-MCNC: 0.75 MG/DL
EGFR: 88 ML/MIN/1.73M2
ESTIMATED AVERAGE GLUCOSE: 137 MG/DL
GLUCOSE SERPL-MCNC: 165 MG/DL
HBA1C MFR BLD HPLC: 6.4 %
HCT VFR BLD CALC: 40.8 %
HGB BLD-MCNC: 13.1 G/DL
MCHC RBC-ENTMCNC: 28 PG
MCHC RBC-ENTMCNC: 32.1 GM/DL
MCV RBC AUTO: 87.2 FL
PLATELET # BLD AUTO: 188 K/UL
POTASSIUM SERPL-SCNC: 4.4 MMOL/L
PROT SERPL-MCNC: 7.5 G/DL
RBC # BLD: 4.68 M/UL
RBC # FLD: 13.4 %
SODIUM SERPL-SCNC: 139 MMOL/L
WBC # FLD AUTO: 6.64 K/UL

## 2024-06-14 PROBLEM — I10 HYPERTENSION, UNSPECIFIED TYPE: Status: ACTIVE | Noted: 2023-04-24

## 2024-06-14 PROBLEM — R07.9 CHEST PAIN: Status: ACTIVE | Noted: 2023-04-24

## 2024-08-24 ENCOUNTER — INPATIENT (INPATIENT)
Facility: HOSPITAL | Age: 64
LOS: 1 days | Discharge: HOME | DRG: 93 | End: 2024-08-26
Attending: INTERNAL MEDICINE | Admitting: INTERNAL MEDICINE
Payer: COMMERCIAL

## 2024-08-24 VITALS
RESPIRATION RATE: 20 BRPM | DIASTOLIC BLOOD PRESSURE: 86 MMHG | TEMPERATURE: 98 F | HEIGHT: 65 IN | OXYGEN SATURATION: 98 % | SYSTOLIC BLOOD PRESSURE: 176 MMHG | WEIGHT: 176.37 LBS | HEART RATE: 67 BPM

## 2024-08-24 DIAGNOSIS — R26.81 UNSTEADINESS ON FEET: ICD-10-CM

## 2024-08-24 LAB
ALBUMIN SERPL ELPH-MCNC: 4.3 G/DL — SIGNIFICANT CHANGE UP (ref 3.3–5)
ALP SERPL-CCNC: 80 U/L — SIGNIFICANT CHANGE UP (ref 40–120)
ALT FLD-CCNC: 19 U/L — SIGNIFICANT CHANGE UP (ref 10–45)
ANION GAP SERPL CALC-SCNC: 10 MMOL/L — SIGNIFICANT CHANGE UP (ref 5–17)
APTT BLD: 32 SEC — SIGNIFICANT CHANGE UP (ref 24.5–35.6)
AST SERPL-CCNC: 29 U/L — SIGNIFICANT CHANGE UP (ref 10–40)
BASOPHILS # BLD AUTO: 0.06 K/UL — SIGNIFICANT CHANGE UP (ref 0–0.2)
BASOPHILS NFR BLD AUTO: 0.7 % — SIGNIFICANT CHANGE UP (ref 0–2)
BILIRUB SERPL-MCNC: 0.4 MG/DL — SIGNIFICANT CHANGE UP (ref 0.2–1.2)
BUN SERPL-MCNC: 23 MG/DL — SIGNIFICANT CHANGE UP (ref 7–23)
CALCIUM SERPL-MCNC: 9.5 MG/DL — SIGNIFICANT CHANGE UP (ref 8.4–10.5)
CHLORIDE SERPL-SCNC: 106 MMOL/L — SIGNIFICANT CHANGE UP (ref 96–108)
CO2 SERPL-SCNC: 21 MMOL/L — LOW (ref 22–31)
CREAT SERPL-MCNC: 0.71 MG/DL — SIGNIFICANT CHANGE UP (ref 0.5–1.3)
EGFR: 94 ML/MIN/1.73M2 — SIGNIFICANT CHANGE UP
EOSINOPHIL # BLD AUTO: 0.04 K/UL — SIGNIFICANT CHANGE UP (ref 0–0.5)
EOSINOPHIL NFR BLD AUTO: 0.5 % — SIGNIFICANT CHANGE UP (ref 0–6)
GLUCOSE BLDC GLUCOMTR-MCNC: 110 MG/DL — HIGH (ref 70–99)
GLUCOSE SERPL-MCNC: 136 MG/DL — HIGH (ref 70–99)
HCT VFR BLD CALC: 45.4 % — HIGH (ref 34.5–45)
HGB BLD-MCNC: 14.2 G/DL — SIGNIFICANT CHANGE UP (ref 11.5–15.5)
IMM GRANULOCYTES NFR BLD AUTO: 0.2 % — SIGNIFICANT CHANGE UP (ref 0–0.9)
INR BLD: 1.06 RATIO — SIGNIFICANT CHANGE UP (ref 0.85–1.18)
LYMPHOCYTES # BLD AUTO: 3.47 K/UL — HIGH (ref 1–3.3)
LYMPHOCYTES # BLD AUTO: 40.2 % — SIGNIFICANT CHANGE UP (ref 13–44)
MCHC RBC-ENTMCNC: 28.7 PG — SIGNIFICANT CHANGE UP (ref 27–34)
MCHC RBC-ENTMCNC: 31.3 GM/DL — LOW (ref 32–36)
MCV RBC AUTO: 91.9 FL — SIGNIFICANT CHANGE UP (ref 80–100)
MONOCYTES # BLD AUTO: 0.69 K/UL — SIGNIFICANT CHANGE UP (ref 0–0.9)
MONOCYTES NFR BLD AUTO: 8 % — SIGNIFICANT CHANGE UP (ref 2–14)
NEUTROPHILS # BLD AUTO: 4.36 K/UL — SIGNIFICANT CHANGE UP (ref 1.8–7.4)
NEUTROPHILS NFR BLD AUTO: 50.4 % — SIGNIFICANT CHANGE UP (ref 43–77)
NRBC # BLD: 0 /100 WBCS — SIGNIFICANT CHANGE UP (ref 0–0)
PLATELET # BLD AUTO: 162 K/UL — SIGNIFICANT CHANGE UP (ref 150–400)
POTASSIUM SERPL-MCNC: 5.3 MMOL/L — SIGNIFICANT CHANGE UP (ref 3.5–5.3)
POTASSIUM SERPL-SCNC: 5.3 MMOL/L — SIGNIFICANT CHANGE UP (ref 3.5–5.3)
PROT SERPL-MCNC: 8.4 G/DL — HIGH (ref 6–8.3)
PROTHROM AB SERPL-ACNC: 11.1 SEC — SIGNIFICANT CHANGE UP (ref 9.5–13)
RBC # BLD: 4.94 M/UL — SIGNIFICANT CHANGE UP (ref 3.8–5.2)
RBC # FLD: 13.2 % — SIGNIFICANT CHANGE UP (ref 10.3–14.5)
SODIUM SERPL-SCNC: 137 MMOL/L — SIGNIFICANT CHANGE UP (ref 135–145)
TROPONIN T, HIGH SENSITIVITY RESULT: <6 NG/L — SIGNIFICANT CHANGE UP (ref 0–51)
WBC # BLD: 8.64 K/UL — SIGNIFICANT CHANGE UP (ref 3.8–10.5)
WBC # FLD AUTO: 8.64 K/UL — SIGNIFICANT CHANGE UP (ref 3.8–10.5)

## 2024-08-24 PROCEDURE — 99285 EMERGENCY DEPT VISIT HI MDM: CPT

## 2024-08-24 PROCEDURE — 70450 CT HEAD/BRAIN W/O DYE: CPT | Mod: 26,MC

## 2024-08-24 RX ORDER — VALSARTAN 40 MG/1
40 TABLET ORAL DAILY
Refills: 0 | Status: DISCONTINUED | OUTPATIENT
Start: 2024-08-24 | End: 2024-08-24

## 2024-08-24 RX ORDER — ASPIRIN 81 MG
81 TABLET, DELAYED RELEASE (ENTERIC COATED) ORAL DAILY
Refills: 0 | Status: DISCONTINUED | OUTPATIENT
Start: 2024-08-24 | End: 2024-08-26

## 2024-08-24 RX ORDER — TICAGRELOR 90 MG/1
90 TABLET ORAL EVERY 12 HOURS
Refills: 0 | Status: DISCONTINUED | OUTPATIENT
Start: 2024-08-24 | End: 2024-08-26

## 2024-08-24 RX ORDER — PANTOPRAZOLE SODIUM 40 MG
40 TABLET, DELAYED RELEASE (ENTERIC COATED) ORAL
Refills: 0 | Status: DISCONTINUED | OUTPATIENT
Start: 2024-08-24 | End: 2024-08-26

## 2024-08-24 RX ORDER — MECLIZINE HYDROCHLORIDE 25 MG/1
25 TABLET ORAL ONCE
Refills: 0 | Status: COMPLETED | OUTPATIENT
Start: 2024-08-24 | End: 2024-08-24

## 2024-08-24 RX ORDER — ROSUVASTATIN CALCIUM 10 MG/1
20 TABLET ORAL AT BEDTIME
Refills: 0 | Status: DISCONTINUED | OUTPATIENT
Start: 2024-08-24 | End: 2024-08-26

## 2024-08-24 RX ORDER — SODIUM CHLORIDE 9 MG/ML
1000 INJECTION INTRAMUSCULAR; INTRAVENOUS; SUBCUTANEOUS ONCE
Refills: 0 | Status: COMPLETED | OUTPATIENT
Start: 2024-08-24 | End: 2024-08-24

## 2024-08-24 RX ORDER — FAMOTIDINE 10 MG/ML
20 INJECTION INTRAVENOUS ONCE
Refills: 0 | Status: COMPLETED | OUTPATIENT
Start: 2024-08-24 | End: 2024-08-24

## 2024-08-24 RX ORDER — DIAZEPAM 10 MG
5 TABLET ORAL ONCE
Refills: 0 | Status: DISCONTINUED | OUTPATIENT
Start: 2024-08-24 | End: 2024-08-24

## 2024-08-24 RX ORDER — VALSARTAN 40 MG/1
20 TABLET ORAL DAILY
Refills: 0 | Status: DISCONTINUED | OUTPATIENT
Start: 2024-08-24 | End: 2024-08-26

## 2024-08-24 RX ORDER — CARVEDILOL 6.25 MG/1
6.25 TABLET ORAL EVERY 12 HOURS
Refills: 0 | Status: DISCONTINUED | OUTPATIENT
Start: 2024-08-24 | End: 2024-08-26

## 2024-08-24 RX ORDER — DAPAGLIFLOZIN 10 MG/1
5 TABLET, FILM COATED ORAL DAILY
Refills: 0 | Status: DISCONTINUED | OUTPATIENT
Start: 2024-08-24 | End: 2024-08-24

## 2024-08-24 RX ADMIN — TICAGRELOR 90 MILLIGRAM(S): 90 TABLET ORAL at 20:01

## 2024-08-24 RX ADMIN — FAMOTIDINE 20 MILLIGRAM(S): 10 INJECTION INTRAVENOUS at 11:47

## 2024-08-24 RX ADMIN — CARVEDILOL 6.25 MILLIGRAM(S): 6.25 TABLET ORAL at 20:01

## 2024-08-24 RX ADMIN — MECLIZINE HYDROCHLORIDE 25 MILLIGRAM(S): 25 TABLET ORAL at 15:06

## 2024-08-24 RX ADMIN — Medication 81 MILLIGRAM(S): at 20:01

## 2024-08-24 RX ADMIN — ROSUVASTATIN CALCIUM 20 MILLIGRAM(S): 10 TABLET ORAL at 21:07

## 2024-08-24 RX ADMIN — SODIUM CHLORIDE 1000 MILLILITER(S): 9 INJECTION INTRAMUSCULAR; INTRAVENOUS; SUBCUTANEOUS at 15:06

## 2024-08-24 RX ADMIN — Medication 5 MILLIGRAM(S): at 11:49

## 2024-08-24 RX ADMIN — VALSARTAN 20 MILLIGRAM(S): 40 TABLET ORAL at 21:08

## 2024-08-24 RX ADMIN — MECLIZINE HYDROCHLORIDE 25 MILLIGRAM(S): 25 TABLET ORAL at 11:30

## 2024-08-24 NOTE — ED PROVIDER NOTE - OBJECTIVE STATEMENT
64-year-old female past medical history of MI (on aspirin and Brilinta), presents from triage as code stroke.  Patient's last known normal 11 PM last night, patient woke up this morning with severe dizziness, difficulty walking, no change in speech, endorses overall weakness, no decreased sensation upper or lower extremity.  No history of stroke in past.  Patient seen evaluated in triage area with  at bedside who collaborates story.  Neurology at bedside for code stroke consult

## 2024-08-24 NOTE — ED PROVIDER NOTE - CARE PLAN
Principal Discharge DX:	Severe vertigo   1 Principal Discharge DX:	Unstable gait  Secondary Diagnosis:	Vertigo

## 2024-08-24 NOTE — CONSULT NOTE ADULT - ATTENDING COMMENTS
DOS 8/25  code stroke called on arrival and neurology emergently assessed patient   Briefly   65 yo F with CAD s/p stent (DAPT) reports to the hospital 2/2 dizziness. Neurology consulted for dizziness. Physical exam shows no focal deficits,. CTH shows no acute abnormalities. CTA deferred 2/2 contrast allergy. Patient states symptoms are resolving.   8/25 back to baseline     Impression: Transient dizziness described as lightheaded in the setting of nausea and abdominal discomfort, NIHSS 0,   Etiology: Unsure, less likely neurological at this time as no focal neuro deficits, would work up other causes such as hypovolemia or cardiac    Recommendations   - MRI brain if symptoms persists.  patient is claustrophobic wants open MRI outpatient   - if back to baseline outpatient MRI brain and MRA H/N   - meclizline PRN  - check orthostatics   - c/w antiplatelet and statin   outpatient /fu on dishcarge   spoke to son at bedside as well   Sukhjinder Gonzalez MD  Vascular Neurology  Office: [phone]

## 2024-08-24 NOTE — CONSULT NOTE ADULT - ASSESSMENT
Impression: Transient dizziness described as lightheaded in the setting of nausea and abdominal discomfort, NIHSS 0,   Etiology: Unsure if neurological versus hypovolemia vs abdominal pathology    Recommendations  - Would recommend infectious workup per primary  - Orthostatic vitals   63 yo F with CAD s/p stent (DAPT) reports to the hospital 2/2 dizziness. Neurology consulted for dizziness. Physical exam shows no focal deficits,. CTH shows no acute abnormalities. CTA deferred 2/2 contrast allergy. Patient states symptoms are resolving.     Impression: Transient dizziness described as lightheaded in the setting of nausea and abdominal discomfort, NIHSS 0,   Etiology: Unsure, less likely neurological at this time as no focal neuro deficits, would work up other causes such as hypovolemia or cardiac    Recommendations  - Would recommend infectious workup per primary  - Orthostatic vitals  - Patient can follow up with general neurology at [address] [place] [address] 1-2 weeks after discharge. Please instruct the patient to call [phone] to schedule this appointment.    Case discussed with stroke fellow, Fernando Ortiz, under supervision of attending, April Collier. Please await attending attestation for final plan.    65 yo F with CAD s/p stent (DAPT) reports to the hospital 2/2 dizziness. Neurology consulted for dizziness. Physical exam shows no focal deficits,. CTH shows no acute abnormalities. CTA deferred 2/2 contrast allergy. Patient states symptoms are resolving.     Impression: Transient dizziness described as lightheaded in the setting of nausea and abdominal discomfort, NIHSS 0,   Etiology: Unsure, less likely neurological at this time as no focal neuro deficits, would work up other causes such as hypovolemia or cardiac    Recommendations  - Would recommend infectious workup per primary  - Orthostatic vitals  - Patient can follow up with Dr. Gonzlaez [phone]  Case discussed with stroke fellow, Fernando Ortiz, under supervision of attending, April Collier. Please await attending attestation for final plan.

## 2024-08-24 NOTE — ED PROVIDER NOTE - CLINICAL SUMMARY MEDICAL DECISION MAKING FREE TEXT BOX
Mike 64-year-old female history of CAD stents on aspirin and Brilinta history of prediabetes presents woke up this morning with severe vertigo dizziness when she moves her head difficulty walking no change in vision no change in speaking no numbness or weakness no paresthesias no recent trauma patient on no anticoagulation no recent surgery last known normal 11 PM last night triage called code stroke for dizziness on examination no nystagmus no droop no drift no aphasia strength 5 out of 5 upper and lower extremity bilaterally no carotid bruit appreciated patient last known normal 12 hours ago with unremarkable neuroexam will get screening EKG to rule out ACS low clinical concern fingerstick 120s will treat with meclizine neuro to evaluate after CT imaging to localize posterior circumflex abnormality

## 2024-08-24 NOTE — ED ADULT NURSE REASSESSMENT NOTE - NS ED NURSE REASSESS COMMENT FT1
Ambulated patient to the bathroom. Patient requiring 2 assists, RN on one side and son on other. Patient having multiple instances of her toppling to the left and still "feeling dizzy but better than when I came in." Patient went to bathroom and requested RN to stay in with her as she was unsteady. MD Frazier made aware.

## 2024-08-24 NOTE — ED ADULT NURSE NOTE - OBJECTIVE STATEMENT
63 yo female with a PMH of MI s/p 2 stents on Brilinta and ASA, DM presents to the ED from EMS from home complaining of dizziness. Patient reports she went to bed at 2300 and woke up at 0300 and began having dizziness. Code stroke initiated in triage. Patient reports vertigo like symptoms when moving her head. Denies any difficulty walking or slurring of speech. Patient is otherwise well appearing. NIH of 0 at this time. Patient awaiting CT scan and dispo. Denies headache, vision changes, chest pain, shortness of breath, abdominal pain, nausea, vomiting, diarrhea, fevers, chills, dysuria, hematuria, recent illness travel or fall.

## 2024-08-24 NOTE — CONSULT NOTE ADULT - PROVIDER SPECIALTY LIST ADULT
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 The patient underwent coil embolization at that time as well as ventriculostomy placement. She eventually was discharged and is doing quite well. Her only complaint today is poor appetite. She presents for routine follow-up status post coil embolization of ruptured left PICA aneurysm. Complaint by her daughter and her . Has a personal history of high blood pressure, denies smoking, and denies family history of subarachnoid hemorrhage.      Past Medical History:   Diagnosis Date   • CAD (coronary artery disease)    • Cerebral aneurysm rupture (CMS/HCC)    • GERD (gastroesophageal reflux disease)    • Hyperlipidemia    • Hypertension    • Hypothyroidism    • IBS (irritable bowel syndrome)    • Lymphocytic colitis    • NSTEMI (non-ST elevated myocardial infarction) (CMS/HCC)    • Osteoporosis    • Pancreatitis        Past Surgical History:   Procedure Laterality Date   • Fracture surgery      repair radial fx   • Hb stent insertion cardiac     • Tonsillectomy         ALLERGIES:   Allergen Reactions   • Losartan Other (See Comments)     ?pancreatis; per [facility]   • Metoprolol Other (See Comments)     itch;  per [facility]   • Atorvastatin Cough     cough;  per [facility]       Outpatient Medications Marked as Taking for the 6/18/19 encounter (Office Visit) with Kiran Sauceda MD   Medication Sig Dispense Refill   • carvedilol (COREG) 3.125 MG tablet Take 3.125 mg by mouth 2 times daily.     • simvastatin (ZOCOR) 40 MG tablet Take 40 mg by mouth nightly.     • omeprazole (PRILOSEC) 20 MG capsule Take 20 mg by mouth daily.     •  levothyroxine (SYNTHROID, LEVOTHROID) 175 MCG tablet Take 150 mcg by mouth daily.      • acetaminophen (TYLENOL) 325 MG tablet Take 325 mg by mouth every 4 hours as needed for Pain.     • alendronate (FOSAMAX) 70 MG tablet Take 70 mg by mouth every 7 days.     • Psyllium 100 % Powder Take 1 Dose by mouth as needed (constipation). take as directed on bottle     • Magnesium Hydroxide (MILK OF MAGNESIA PO) Take 1 Dose by mouth as needed (constipation). take as directed on the bottle         Family History   Problem Relation Age of Onset   • Heart Mother    • Heart Father    • Cancer Father    • Heart Sister        Social History     Tobacco Use   • Smoking status: Never Smoker   • Smokeless tobacco: Never Used   Substance Use Topics   • Alcohol use: Never     Frequency: Never   • Drug use: Never        Review of Systems:   In addition to that noted in the HPI and PMH, also significant for appetite change, chills, fatigue, unexpected weight change, drooling, ear pain, postnasal drip, rhinorrhea, voice change, visual disturbances, back pain, gait problems, dizziness, and lightheadedness.  The remainder of the patient's multi-system review is otherwise negative.    Physical Exam:    Well developed, well nourished female in no apparent distress who appears stated age and answers appropriately.    Blood pressure 132/74, pulse 74, temperature 97.4 °F (36.3 °C), temperature source Temporal, height 4' 10\" (1.473 m), weight 45.8 kg.     Integument:  Skin warm and dry, no obvious lesions.    HEENT:  Head normocephalic.   Conjunctiva and lids normal, sclerae non-icteric.  Pupils equal, round and reactive to light.  EOMs full.  Ears without deformity, hearing intact bilaterally to finger rub.  Nose without deformity, airways patent.  Normal facial symmetry and sensation.  Buccal mucosa pink and moist. Teeth in good repair.  Posterior pharynx without injection or exudates.  Palate elevates symmetrically with  phonation, tongue  extends on midline without fasciculations.     Neck: Supple, full range of motion, non-tender.  Trachea midline.      Thorax: Symmetric without CVA or spinal tenderness.    Lungs: Clear to auscultation. Normal percussion.  No retractions.    Heart: Regular rate and rhythm, no murmurs or extra sounds.    Abdomen: Soft, non-tender, non-distended, no masses, bruits or hernias.  Bowel sounds are normoactive.    Breast/genitorectal Exam: Deferred.    Extremities: Without deformity, clubbing, cyanosis or edema, distal pulses intact.  Good temperature and color.  Full ROM without crepitus or laxity.    Neuro:  Cranial nerves II-XII intact.  Speech fluent.  Motor exam reveals normal tone without evidence of atrophy, fasciculations or tremors.  There is full power in the upper and lower extremity muscle groups.  Sensory exam intact to light touch.  Reflexes are 1/4 in the biceps, triceps, brachioradialis, patellar and Achilles tendons.      Mental status: Alert and oriented x 3, pleasant affect.    STUDIES  CT of head and CT angiogram was reviewed from February of this year which demonstrates diffuse subarachnoid hemorrhage and evidence of left PICA aneurysm.    ASSESSMENT  71 year old female who presents in follow-up status post coil embolization of ruptured left PICA aneurysm in February of this year.    PLAN  Exam as above. The patient and family was counseled on the importance of routine follow-up angiograms in the setting of subarachnoid hemorrhage. As such, I would recommend performing a repeat diagnostic cerebral angiogram to reassess the treatment of her left PICA aneurysm as well as assess for any other additional aneurysms. We will schedule this in the near future. Thank you for this consultation and please call with questions.     Neurology

## 2024-08-24 NOTE — H&P ADULT - ASSESSMENT
The patient is a 64y Female complaining of dizziness.    S/p code stroke:    Neuro eval appreciated  CT head: No acute CVA  Pt is claustrophobic - states she can't do closed MRI.  TTE  Neuro checks    CAD:    Skip ASA/Adriana Alcantar family

## 2024-08-24 NOTE — CONSULT NOTE ADULT - SUBJECTIVE AND OBJECTIVE BOX
**STROKE CODE CONSULT NOTE**    Last known well time/Time of onset of symptoms: 8/24 2300    HPI: 65 yo F with CAD s/p stent (DAPT) reports to the hospital 2/2 dizziness. Neurology consulted for dizziness. Patient states he had abdominal pain along with nausea. Patient states she had some     SOCIAL HISTORY:    PAST MEDICAL & SURGICAL HISTORY:      FAMILY HISTORY:      ROS:  Constitutional: No fever, weight loss or fatigue  Eyes: No eye pain, visual disturbances, or discharge  ENMT:  No difficulty hearing, tinnitus, vertigo; No sinus or throat pain  Neck: No pain or stiffness  Respiratory: No cough, wheezing, chills or hemoptysis  Cardiovascular: No chest pain, palpitations, shortness of breath, dizziness or leg swelling  Gastrointestinal: No abdominal pain. No nausea, vomiting or hematemesis; No diarrhea or constipation. Nohematochezia.  Genitourinary: No dysuria, frequency, hematuria or incontinence  Neurological: As per HPI  Skin: No itching, burning, rashes or lesions   Endocrine: No heat or cold intolerance; No hair loss  Musculoskeletal: No joint pain or swelling; No muscle, back or extremity pain  Psychiatric: No depression, anxiety, mood swings or difficulty sleeping  Heme/Lymph: No easy bruising or bleeding gums    MEDICATIONS  (STANDING):    MEDICATIONS  (PRN):      Allergies    IV Contrast (Unknown)    Intolerances        Vital Signs Last 24 Hrs  T(C): 36.9 (24 Aug 2024 10:49), Max: 36.9 (24 Aug 2024 10:49)  T(F): 98.5 (24 Aug 2024 10:49), Max: 98.5 (24 Aug 2024 10:49)  HR: 67 (24 Aug 2024 10:49) (67 - 67)  BP: 176/86 (24 Aug 2024 10:49) (176/86 - 176/86)  BP(mean): --  RR: 20 (24 Aug 2024 10:49) (20 - 20)  SpO2: 98% (24 Aug 2024 10:49) (98% - 98%)    Parameters below as of 24 Aug 2024 10:49  Patient On (Oxygen Delivery Method): room air        PHYSICAL EXAM:  Constitutional: WDWN; NAD  Cardiovascular: RRR, no appreciable murmurs; no carotid bruits  Neurologic:  Mental status: Awake, alert and oriented ().  Recent and remote memory intact.  Naming, repetition and comprehension intact.  Attention/concentration intact.  No dysarthria, no aphasia.  Fund of knowledge appropriate.    Cranial nerves: Fundoscopic exam demonstrated no abnormalities, pupils equally round and reactive to light, visual fields full, no nystagmus, extraocular muscles intact, V1 through V3 intact bilaterally and symmetric, face symmetric, hearing intact to finger rub, palate elevation symmetric, tongue was midline, sternocleidomastoid/shoulder shrug strength bilaterally 5/5.    Motor:  Normal bulk and tone, strength 5/5 in bilateral upper and lower extremities.   strength 5/5.  Rapid alternating movements intact and symmetric.   Sensation: Intact to light touch, proprioception, and pinprick.  No neglect.   Coordination: No dysmetria on finger-to-nose and heel-to-shin.  No clumsiness.  Reflexes: 2+ in upper and lower extremities, downgoing toes bilaterally  Gait: Narrow and steady. No ataxia.  Romberg negative    NIHSS:    Fingerstick Blood Glucose: CAPILLARY BLOOD GLUCOSE      POCT Blood Glucose.: 123 mg/dL (24 Aug 2024 10:51)       LABS:                    RADIOLOGY & ADDITIONAL STUDIES:    IV-tenecteplase(Y/N):                                   Bolus time:  Reason IV-tenecteplase not given:   **STROKE CODE CONSULT NOTE**    Last known well time/Time of onset of symptoms: 8/24 2300    HPI: 65 yo F with CAD s/p stent (DAPT) reports to the hospital 2/2 dizziness. Neurology consulted for dizziness. Patient states she had some abdominal discomfort when she woke up along with some nausea. Patient states she did not eat any breakfast this morning and did not her any of her meds today. Patient states she also had some dizziness with this, denies any room spinning sensation or wobbly sensation but states it get worse when she stands up, does not notice it as much when she is sitting. Patient also report some generalized weakness, but denies any focal weakness or numbness. Patient denies any visual disturbances. Patient states she has had multiple surgeries in the abdomen. Patient denies any history of neurological condition such as stroke or seizures. Patient reports she had a stent placed in January 2023 and currently still on DAPT along with GDMT for HF.   On repeat examination, patient states her dizziness as resolved and states that she is feeling nauseous     SOCIAL HISTORY: Patient lives at home, denies any history of tobacco, alcohol or other recreational drug use.     PAST MEDICAL & SURGICAL HISTORY:      FAMILY HISTORY:      ROS:  Constitutional: No fever, weight loss or fatigue  Eyes: No visual disturbances,  ENMT:  No difficulty hearing  Neck: No pain or stiffness  Respiratory: No cough, wheezing, chills or hemoptysis  Cardiovascular: No chest pain, palpitations, shortness of breath  Gastrointestinal: + abdominal discomfort, nausea, vomiting  Neurological: As per HPI    MEDICATIONS  (STANDING):    MEDICATIONS  (PRN):      Allergies    IV Contrast (Unknown)    Intolerances        Vital Signs Last 24 Hrs  T(C): 36.9 (24 Aug 2024 10:49), Max: 36.9 (24 Aug 2024 10:49)  T(F): 98.5 (24 Aug 2024 10:49), Max: 98.5 (24 Aug 2024 10:49)  HR: 67 (24 Aug 2024 10:49) (67 - 67)  BP: 176/86 (24 Aug 2024 10:49) (176/86 - 176/86)  BP(mean): --  RR: 20 (24 Aug 2024 10:49) (20 - 20)  SpO2: 98% (24 Aug 2024 10:49) (98% - 98%)    Parameters below as of 24 Aug 2024 10:49  Patient On (Oxygen Delivery Method): room air        PHYSICAL EXAM:  Constitutional: WDWN; NAD  Cardiovascular: RRR, no appreciable murmurs; no carotid bruits  Neurologic:  Mental status: Awake, alert and oriented to name, age.  Recent and remote memory intact.  Naming, repetition and comprehension intact.  Attention/concentration intact.  No dysarthria, no aphasia.  Fund of knowledge appropriate.    Cranial nerves: Fundoscopic exam demonstrated no abnormalities, pupils equally round and reactive to light, visual fields full, no nystagmus, extraocular muscles intact, V1 through V3 intact bilaterally and symmetric, face symmetric, hearing intact to finger rub, palate elevation symmetric, tongue was midline, sternocleidomastoid/shoulder shrug strength bilaterally 5/5.    Motor:  Normal bulk and tone, strength 5/5 in bilateral upper and lower extremities.   strength 5/5.  Rapid alternating movements intact and symmetric.   Sensation: Intact to light touch, proprioception, and pinprick.  No neglect.   Coordination: No dysmetria on finger-to-nose and heel-to-shin.  No clumsiness.  Reflexes: 2+ in upper and lower extremities, downgoing toes bilaterally  Gait: Narrow and steady. No ataxia.  Romberg negative    NIHSS:    Fingerstick Blood Glucose: CAPILLARY BLOOD GLUCOSE      POCT Blood Glucose.: 123 mg/dL (24 Aug 2024 10:51)       LABS:                    RADIOLOGY & ADDITIONAL STUDIES:    IV-tenecteplase(Y/N):                                   Bolus time:  Reason IV-tenecteplase not given:   **STROKE CODE CONSULT NOTE**    Last known well time/Time of onset of symptoms: 8/24 2300    HPI: 65 yo F with CAD s/p stent (DAPT) reports to the hospital 2/2 dizziness. Neurology consulted for dizziness. Patient states she had some abdominal discomfort when she woke up along with some nausea. Patient states she did not eat any breakfast this morning and did not her any of her meds today. Patient states she also had some dizziness described as lightheadedness with this, denies any room spinning sensation or wobbly sensation but states it get worse when she stands up, does not notice it as much when she is sitting. Patient also report some generalized weakness, but denies any focal weakness or numbness. Patient denies any visual disturbances. Patient states she has had multiple surgeries in the abdomen. Patient denies any history of neurological condition such as stroke or seizures. Patient reports she had a stent placed in January 2023 and currently still on DAPT along with GDMT for HF.   On repeat examination, patient states her dizziness as resolved and states that she is feeling nauseous     SOCIAL HISTORY: Patient lives at home, denies any history of tobacco, alcohol or other recreational drug use.     PAST MEDICAL & SURGICAL HISTORY:      FAMILY HISTORY:      ROS:  Constitutional: No fever, weight loss or fatigue  Eyes: No visual disturbances,  ENMT:  No difficulty hearing  Neck: No pain or stiffness  Respiratory: No cough, wheezing, chills or hemoptysis  Cardiovascular: No chest pain, palpitations, shortness of breath  Gastrointestinal: + abdominal discomfort, nausea, vomiting  Neurological: As per HPI    MEDICATIONS  (STANDING):    MEDICATIONS  (PRN):      Allergies    IV Contrast (Unknown)    Intolerances        Vital Signs Last 24 Hrs  T(C): 36.9 (24 Aug 2024 10:49), Max: 36.9 (24 Aug 2024 10:49)  T(F): 98.5 (24 Aug 2024 10:49), Max: 98.5 (24 Aug 2024 10:49)  HR: 67 (24 Aug 2024 10:49) (67 - 67)  BP: 176/86 (24 Aug 2024 10:49) (176/86 - 176/86)  BP(mean): --  RR: 20 (24 Aug 2024 10:49) (20 - 20)  SpO2: 98% (24 Aug 2024 10:49) (98% - 98%)    Parameters below as of 24 Aug 2024 10:49  Patient On (Oxygen Delivery Method): room air        PHYSICAL EXAM:  Constitutional: WDWN; NAD  Neurologic:  Mental status: Awake, alert and oriented to name, age, month.  Recent and remote memory intact.  Naming, repetition and comprehension intact.  Attention/concentration intact.  No dysarthria  Cranial nerves: Pupils equally round and reactive to light, visual fields full, no nystagmus, extraocular muscles intact, V1 through V3 intact bilaterally and symmetric, face symmetric, palate elevation symmetric, tongue was midline, sternocleidomastoid/shoulder shrug strength bilaterally 5/5.    Motor:  Normal bulk and tone, strength 5/5 in bilateral upper and lower extremities.   strength 5/5.   Sensation: Intact to light touch. No neglect.   Coordination: No dysmetria on finger-to-nose and heel-to-shin.  No clumsiness.  Reflexes: 2+ in upper and lower extremities, downgoing toes bilaterally  Gait: Narrow and steady. No ataxia.    NIHSS: 0    Fingerstick Blood Glucose: CAPILLARY BLOOD GLUCOSE      POCT Blood Glucose.: 123 mg/dL (24 Aug 2024 10:51)       LABS:                    RADIOLOGY & ADDITIONAL STUDIES:  [facility] 8/24  IMPRESSION:  No acute intracranial hemorrhage, mass effect, or midline shift. Mild atrophy and small vessel white matter ischemic changes.    IV-tenecteplase(Y/N):     N                              Bolus time: N/a  Reason IV-tenecteplase not given: Outside time window   **STROKE CODE CONSULT NOTE**    Last known well time/Time of onset of symptoms: 8/24 2300    HPI: 63 yo F with CAD s/p stent (DAPT) reports to the hospital 2/2 dizziness. Neurology consulted for dizziness. Patient states she had some abdominal discomfort when she woke up at 3a along with some nausea. Patient states she also had some dizziness described as lightheadedness with this, denies any room spinning sensation or wobbly sensation but states it get worse when she stands up, does not notice it as much when she is sitting. Patient states she did not eat any breakfast this morning and did not her any of her meds today.  Patient also report some generalized weakness, but denies any focal weakness or numbness. Patient denies any visual disturbances. Patient states she has had multiple surgeries in the abdomen. Patient denies any history of neurological condition such as stroke or seizures. Patient reports she had a stent placed in January 2023 and currently still on DAPT along with GDMT for HF.   On repeat examination, patient states her dizziness as resolved and states that she is feeling nauseous.    SOCIAL HISTORY: Patient lives at home, denies any history of tobacco, alcohol or other recreational drug use.     PAST MEDICAL & SURGICAL HISTORY:      FAMILY HISTORY:      ROS:  Constitutional: No fever, weight loss or fatigue  Eyes: No visual disturbances,  ENMT:  No difficulty hearing  Neck: No pain or stiffness  Respiratory: No cough, wheezing, chills or hemoptysis  Cardiovascular: No chest pain, palpitations, shortness of breath  Gastrointestinal: + abdominal discomfort, nausea, vomiting  Neurological: As per HPI    MEDICATIONS  (STANDING):    MEDICATIONS  (PRN):      Allergies    IV Contrast (Unknown)    Intolerances        Vital Signs Last 24 Hrs  T(C): 36.9 (24 Aug 2024 10:49), Max: 36.9 (24 Aug 2024 10:49)  T(F): 98.5 (24 Aug 2024 10:49), Max: 98.5 (24 Aug 2024 10:49)  HR: 67 (24 Aug 2024 10:49) (67 - 67)  BP: 176/86 (24 Aug 2024 10:49) (176/86 - 176/86)  BP(mean): --  RR: 20 (24 Aug 2024 10:49) (20 - 20)  SpO2: 98% (24 Aug 2024 10:49) (98% - 98%)    Parameters below as of 24 Aug 2024 10:49  Patient On (Oxygen Delivery Method): room air        PHYSICAL EXAM:  Constitutional: WDWN; NAD  Neurologic:  Mental status: Awake, alert and oriented to name, age, month.  Recent and remote memory intact.  Naming, repetition and comprehension intact.  No dysarthria  Cranial nerves: Pupils equally round and reactive to light, visual fields full, no nystagmus, extraocular muscles intact, V1 through V3 intact bilaterally and symmetric, face symmetric, palate elevation symmetric, tongue was midline, sternocleidomastoid/shoulder shrug strength bilaterally 5/5.    Motor:  Normal bulk and tone, strength 5/5 in bilateral upper and lower extremities.   strength 5/5.   Sensation: Intact to light touch. No neglect.   Coordination: No dysmetria on finger-to-nose and heel-to-shin.  No clumsiness.  Reflexes: 2+ in upper and lower extremities, downgoing toes bilaterally  Gait: Narrow and steady. No ataxia.    NIHSS: 0    Fingerstick Blood Glucose: CAPILLARY BLOOD GLUCOSE      POCT Blood Glucose.: 123 mg/dL (24 Aug 2024 10:51)       LABS:                    RADIOLOGY & ADDITIONAL STUDIES:  CT 8/24  IMPRESSION:  No acute intracranial hemorrhage, mass effect, or midline shift. Mild atrophy and small vessel white matter ischemic changes.    IV-tenecteplase(Y/N):     N                              Bolus time: N/a  Reason IV-tenecteplase not given: Outside time window

## 2024-08-24 NOTE — H&P ADULT - NSHPPOAPULMEMBOLUS_GEN_A_CORE
Discharge Call Back    Attempted to contact patient no answer. Message left no message left. Line busy.  ED chart reviewed. Patient has followed up with her primary MD.          Bella Buck MD  09/25/17 [id number]     no

## 2024-08-24 NOTE — ED PROVIDER NOTE - HIV OFFER
Chest pain the patient states I have had pain on the right side of my chest that wraps around into the back/I do feel a little SOB/I feel nauseated/no vomiting/no diarrhea/no injury to chest wall\".      Debbi Estrada LPN  94/39/68 [id number]
Pt scripts x2 instructed to follow up with PCP. Assessed per Jenny YIN.      Ruiz Perez LPN  10/93/15 [id number]
Opt out

## 2024-08-24 NOTE — ED ADULT NURSE NOTE - NSFALLHARMRISKINTERV_ED_ALL_ED

## 2024-08-24 NOTE — ED PROVIDER NOTE - PROGRESS NOTE DETAILS
as per pt and  allergic to iv dye from angioagram - she was unconsious - unsure if anaphylaxis vs sedation from procedure - pt neuro exam is intact currently - and last known normal is 11pm -12 hrs ago not lytic candidate - will do ct head non con only and possible mri for pst circ Yu Frazier MD (PGY-3 EM): pt states history of appendectomy, Cholecystectomy, choledocholithiasis, abdomen benign on physical exam. pt feeliong better after meclizine - will attmept to ambulate Yu Frazier MD (PGY-3 EM): dizziness improved. no abd pain. patient unsteady gait w/ ambulation. will admit for unsafe discharge.

## 2024-08-24 NOTE — ED PROVIDER NOTE - PHYSICAL EXAMINATION
PHYSICAL EXAM:  CONSTITUTIONAL: appearing, awake, alert, oriented to person, place, time/situation and in no apparent distress.  HEAD: Atraumatic  EYES: Clear bilaterally, pupils equal, round and reactive to light.  ENMT: Airway patent, Nasal mucosa clear. Mouth with normal mucosa. Uvula is midline.   CARDIAC: Normal rate, regular rhythm. +S1/S2. No murmurs, rubs or gallops.  RESPIRATORY: Breathing unlabored. Breath sounds clear and equal bilaterally.  ABDOMEN:  Soft, nontender, nondistended. No rebound tenderness or guarding.  NEUROLOGICAL: Alert and oriented, no focal deficits, no motor or sensory deficits. CN2-12 intact. Sensation intact x4 extremities.  SKIN: Skin warm and dry. No evidence of rashes or lesions.

## 2024-08-24 NOTE — STROKE CODE NOTE - NIH STROKE SCALE DATE
This patient has been identified as being eligible for the Care Transitions program, a post-hospital discharge program designed to decrease readmission risk and increase continuity of care for patients.   
24-Aug-2024 11:05

## 2024-08-25 LAB
A1C WITH ESTIMATED AVERAGE GLUCOSE RESULT: 6.4 % — HIGH (ref 4–5.6)
ALBUMIN SERPL ELPH-MCNC: 3.8 G/DL — SIGNIFICANT CHANGE UP (ref 3.3–5)
ALP SERPL-CCNC: 66 U/L — SIGNIFICANT CHANGE UP (ref 40–120)
ALT FLD-CCNC: 13 U/L — SIGNIFICANT CHANGE UP (ref 10–45)
ANION GAP SERPL CALC-SCNC: 13 MMOL/L — SIGNIFICANT CHANGE UP (ref 5–17)
AST SERPL-CCNC: 18 U/L — SIGNIFICANT CHANGE UP (ref 10–40)
BILIRUB SERPL-MCNC: 0.3 MG/DL — SIGNIFICANT CHANGE UP (ref 0.2–1.2)
BUN SERPL-MCNC: 17 MG/DL — SIGNIFICANT CHANGE UP (ref 7–23)
CALCIUM SERPL-MCNC: 9.1 MG/DL — SIGNIFICANT CHANGE UP (ref 8.4–10.5)
CHLORIDE SERPL-SCNC: 108 MMOL/L — SIGNIFICANT CHANGE UP (ref 96–108)
CO2 SERPL-SCNC: 21 MMOL/L — LOW (ref 22–31)
CREAT SERPL-MCNC: 0.64 MG/DL — SIGNIFICANT CHANGE UP (ref 0.5–1.3)
EGFR: 97 ML/MIN/1.73M2 — SIGNIFICANT CHANGE UP
ESTIMATED AVERAGE GLUCOSE: 137 MG/DL — HIGH (ref 68–114)
GLUCOSE SERPL-MCNC: 102 MG/DL — HIGH (ref 70–99)
HCT VFR BLD CALC: 40.3 % — SIGNIFICANT CHANGE UP (ref 34.5–45)
HGB BLD-MCNC: 12.7 G/DL — SIGNIFICANT CHANGE UP (ref 11.5–15.5)
MCHC RBC-ENTMCNC: 28.4 PG — SIGNIFICANT CHANGE UP (ref 27–34)
MCHC RBC-ENTMCNC: 31.5 GM/DL — LOW (ref 32–36)
MCV RBC AUTO: 90.2 FL — SIGNIFICANT CHANGE UP (ref 80–100)
NRBC # BLD: 0 /100 WBCS — SIGNIFICANT CHANGE UP (ref 0–0)
PLATELET # BLD AUTO: 164 K/UL — SIGNIFICANT CHANGE UP (ref 150–400)
POTASSIUM SERPL-MCNC: 3.7 MMOL/L — SIGNIFICANT CHANGE UP (ref 3.5–5.3)
POTASSIUM SERPL-SCNC: 3.7 MMOL/L — SIGNIFICANT CHANGE UP (ref 3.5–5.3)
PROT SERPL-MCNC: 7.2 G/DL — SIGNIFICANT CHANGE UP (ref 6–8.3)
RBC # BLD: 4.47 M/UL — SIGNIFICANT CHANGE UP (ref 3.8–5.2)
RBC # FLD: 13.3 % — SIGNIFICANT CHANGE UP (ref 10.3–14.5)
SODIUM SERPL-SCNC: 142 MMOL/L — SIGNIFICANT CHANGE UP (ref 135–145)
WBC # BLD: 7.08 K/UL — SIGNIFICANT CHANGE UP (ref 3.8–10.5)
WBC # FLD AUTO: 7.08 K/UL — SIGNIFICANT CHANGE UP (ref 3.8–10.5)

## 2024-08-25 RX ORDER — MECLIZINE HYDROCHLORIDE 25 MG/1
25 TABLET ORAL EVERY 8 HOURS
Refills: 0 | Status: DISCONTINUED | OUTPATIENT
Start: 2024-08-25 | End: 2024-08-26

## 2024-08-25 RX ORDER — FAMOTIDINE 10 MG/ML
20 INJECTION INTRAVENOUS ONCE
Refills: 0 | Status: COMPLETED | OUTPATIENT
Start: 2024-08-25 | End: 2024-08-25

## 2024-08-25 RX ADMIN — ROSUVASTATIN CALCIUM 20 MILLIGRAM(S): 10 TABLET ORAL at 21:18

## 2024-08-25 RX ADMIN — CARVEDILOL 6.25 MILLIGRAM(S): 6.25 TABLET ORAL at 21:18

## 2024-08-25 RX ADMIN — Medication 81 MILLIGRAM(S): at 10:14

## 2024-08-25 RX ADMIN — CARVEDILOL 6.25 MILLIGRAM(S): 6.25 TABLET ORAL at 10:13

## 2024-08-25 RX ADMIN — FAMOTIDINE 20 MILLIGRAM(S): 10 INJECTION INTRAVENOUS at 23:13

## 2024-08-25 RX ADMIN — TICAGRELOR 90 MILLIGRAM(S): 90 TABLET ORAL at 17:39

## 2024-08-25 RX ADMIN — Medication 40 MILLIGRAM(S): at 05:14

## 2024-08-25 RX ADMIN — TICAGRELOR 90 MILLIGRAM(S): 90 TABLET ORAL at 06:06

## 2024-08-25 NOTE — PROGRESS NOTE ADULT - ASSESSMENT
The patient is a 64y Female complaining of dizziness.    S/p code stroke:    Neuro eval appreciated  CT head: No acute CVA  Pt is claustrophobic - states she can't do closed MRI.  MRI brain as OP unless her change in her neurological status   TTE  Neuro checks  PT eval appreciated    CAD:    Skip ASA/Adriana    Dw family

## 2024-08-25 NOTE — PATIENT PROFILE ADULT - FALL HARM RISK - HARM RISK INTERVENTIONS
Assistance with ambulation/Assistance OOB with selected safe patient handling equipment/Communicate Risk of Fall with Harm to all staff/Monitor gait and stability/Reinforce activity limits and safety measures with patient and family/Sit up slowly, dangle for a short time, stand at bedside before walking/Tailored Fall Risk Interventions/Visual Cue: Yellow wristband and red socks/Bed in lowest position, wheels locked, appropriate side rails in place/Call bell, personal items and telephone in reach/Instruct patient to call for assistance before getting out of bed or chair/Non-slip footwear when patient is out of bed/[place] to call system/Physically safe environment - no spills, clutter or unnecessary equipment/Purposeful Proactive Rounding/Room/bathroom lighting operational, light cord in reach

## 2024-08-25 NOTE — PHYSICAL THERAPY INITIAL EVALUATION ADULT - PLANNED THERAPY INTERVENTIONS, PT EVAL
GOAL: Pt will perform 10 steps with U HR independently within 3-4weeks./bed mobility training/gait training/transfer training

## 2024-08-25 NOTE — PHYSICAL THERAPY INITIAL EVALUATION ADULT - GROSSLY INTACT, SENSORY
Grossly Intact
Yovanny Hou  OBSTETRICS AND GYNECOLOGY  [address] [place] [address]  Phone: [phone]  Fax: [phone]  Follow Up Time:

## 2024-08-25 NOTE — PHYSICAL THERAPY INITIAL EVALUATION ADULT - NSPTDISCHREC_GEN_A_CORE
home with home PT services for general strengthening, to increase endurance, address fall prevention, perform balance training, safety assessment of home environment, and to restore pt's prior level of function.

## 2024-08-25 NOTE — PHYSICAL THERAPY INITIAL EVALUATION ADULT - ADDITIONAL COMMENTS
Pt lives with family in private home with 10 steps to enter, no stairs inside. PTA, pt was independent with all mobility without use of assistive device. Pt active and goes to the gym daily to use bike and performing exercises.

## 2024-08-25 NOTE — PATIENT PROFILE ADULT - FALL HARM RISK - DEVICES
None Expiration Date (Optional): 2024-08-01 Urine Pregnancy Test Result: negative Lot # (Optional): [id number] Performed By: Ofelia Quinones MA- Control Validated Detail Level: None

## 2024-08-25 NOTE — PROGRESS NOTE ADULT - SUBJECTIVE AND OBJECTIVE BOX
Patient is a 64y old  Female who presents with a chief complaint of The patient is a 64y Female complaining of dizziness. (24 Aug 2024 18:15)      SUBJECTIVE / OVERNIGHT EVENTS:    Events noted. Feels better  CONSTITUTIONAL: No fever,  or fatigue  RESPIRATORY: No cough, wheezing,  No shortness of breath  CARDIOVASCULAR: No chest pain, palpitations, dizziness, or leg swelling  GASTROINTESTINAL: No abdominal or epigastric pain.       MEDICATIONS  (STANDING):  aspirin enteric coated 81 milliGRAM(s) Oral daily  carvedilol 6.25 milliGRAM(s) Oral every 12 hours  pantoprazole    Tablet 40 milliGRAM(s) Oral before breakfast  rosuvastatin 20 milliGRAM(s) Oral at bedtime  ticagrelor 90 milliGRAM(s) Oral every 12 hours  valsartan 20 milliGRAM(s) Oral daily    MEDICATIONS  (PRN):  meclizine 25 milliGRAM(s) Oral every 8 hours PRN Dizziness        CAPILLARY BLOOD GLUCOSE      POCT Blood Glucose.: 110 mg/dL (24 Aug 2024 21:30)    I&O's Summary      T(C): 36.7 (08-25-24 @ 11:40), Max: 37.1 (08-24-24 @ 18:12)  HR: 58 (08-25-24 @ 11:40) (56 - 72)  BP: 121/80 (08-25-24 @ 11:40) (113/71 - 162/95)  RR: 17 (08-25-24 @ 11:40) (15 - 18)  SpO2: 98% (08-25-24 @ 11:40) (95% - 99%)    PHYSICAL EXAM:  GENERAL: NAD  NECK: Supple, No JVD  CHEST/LUNG: Clear to auscultation bilaterally; No wheezing.  HEART: Regular rate and rhythm; No murmurs, rubs, or gallops  ABDOMEN: Soft, Nontender, Nondistended; Bowel sounds present  EXTREMITIES:   No edema  NEUROLOGY: AAO X 3      LABS:                        12.7   7.08  )-----------( 164      ( 25 Aug 2024 07:22 )             40.3     08-25    142  |  108  |  17  ----------------------------<  102<H>  3.7   |  21<L>  |  0.64    Ca    9.1      25 Aug 2024 07:25    TPro  7.2  /  Alb  3.8  /  TBili  0.3  /  DBili  x   /  AST  18  /  ALT  13  /  AlkPhos  66  08-25    PT/INR - ( 24 Aug 2024 11:08 )   PT: 11.1 sec;   INR: 1.06 ratio         PTT - ( 24 Aug 2024 11:08 )  PTT:32.0 sec      Urinalysis Basic - ( 25 Aug 2024 07:25 )    Color: x / Appearance: x / SG: x / pH: x  Gluc: 102 mg/dL / Ketone: x  / Bili: x / Urobili: x   Blood: x / Protein: x / Nitrite: x   Leuk Esterase: x / RBC: x / WBC x   Sq Epi: x / Non Sq Epi: x / Bacteria: x      CAPILLARY BLOOD GLUCOSE      POCT Blood Glucose.: 110 mg/dL (24 Aug 2024 21:30)        RADIOLOGY & ADDITIONAL TESTS:    Imaging Personally Reviewed:    Consultant(s) Notes Reviewed:      Care Discussed with Consultants/Other Providers:    Jesse Forman MD, CMD, FACP    [address] [place] [address]  Office Tel: [phone]  Cell: [phone]

## 2024-08-25 NOTE — PHYSICAL THERAPY INITIAL EVALUATION ADULT - PERTINENT HX OF CURRENT PROBLEM, REHAB EVAL
64-year-old female past medical history of MI (on aspirin and Brilinta), presents from triage as code stroke.  Patient's last known normal 11 PM last night, patient woke up this morning with severe dizziness, difficulty walking, no change in speech, but endorses overall weakness, no decreased sensation upper or lower extremity.  No history of stroke in past.  Patient seen evaluated in triage area with  at bedside who collaborates story.  Neurology at bedside for code stroke consult.

## 2024-08-26 ENCOUNTER — TRANSCRIPTION ENCOUNTER (OUTPATIENT)
Age: 64
End: 2024-08-26

## 2024-08-26 ENCOUNTER — RESULT REVIEW (OUTPATIENT)
Age: 64
End: 2024-08-26

## 2024-08-26 VITALS
DIASTOLIC BLOOD PRESSURE: 82 MMHG | OXYGEN SATURATION: 98 % | TEMPERATURE: 98 F | RESPIRATION RATE: 18 BRPM | SYSTOLIC BLOOD PRESSURE: 132 MMHG | HEART RATE: 61 BPM

## 2024-08-26 PROCEDURE — 84295 ASSAY OF SERUM SODIUM: CPT

## 2024-08-26 PROCEDURE — 93005 ELECTROCARDIOGRAM TRACING: CPT

## 2024-08-26 PROCEDURE — 83036 HEMOGLOBIN GLYCOSYLATED A1C: CPT

## 2024-08-26 PROCEDURE — 83605 ASSAY OF LACTIC ACID: CPT

## 2024-08-26 PROCEDURE — 82435 ASSAY OF BLOOD CHLORIDE: CPT

## 2024-08-26 PROCEDURE — 82962 GLUCOSE BLOOD TEST: CPT

## 2024-08-26 PROCEDURE — 93306 TTE W/DOPPLER COMPLETE: CPT | Mod: 26

## 2024-08-26 PROCEDURE — 82947 ASSAY GLUCOSE BLOOD QUANT: CPT

## 2024-08-26 PROCEDURE — 82803 BLOOD GASES ANY COMBINATION: CPT

## 2024-08-26 PROCEDURE — 85025 COMPLETE CBC W/AUTO DIFF WBC: CPT

## 2024-08-26 PROCEDURE — 96374 THER/PROPH/DIAG INJ IV PUSH: CPT

## 2024-08-26 PROCEDURE — 85018 HEMOGLOBIN: CPT

## 2024-08-26 PROCEDURE — 99285 EMERGENCY DEPT VISIT HI MDM: CPT

## 2024-08-26 PROCEDURE — 80053 COMPREHEN METABOLIC PANEL: CPT

## 2024-08-26 PROCEDURE — 85730 THROMBOPLASTIN TIME PARTIAL: CPT

## 2024-08-26 PROCEDURE — C8929: CPT

## 2024-08-26 PROCEDURE — 70450 CT HEAD/BRAIN W/O DYE: CPT | Mod: MC

## 2024-08-26 PROCEDURE — 84484 ASSAY OF TROPONIN QUANT: CPT

## 2024-08-26 PROCEDURE — 85014 HEMATOCRIT: CPT

## 2024-08-26 PROCEDURE — 97161 PT EVAL LOW COMPLEX 20 MIN: CPT

## 2024-08-26 PROCEDURE — 36415 COLL VENOUS BLD VENIPUNCTURE: CPT

## 2024-08-26 PROCEDURE — 84132 ASSAY OF SERUM POTASSIUM: CPT

## 2024-08-26 PROCEDURE — 85610 PROTHROMBIN TIME: CPT

## 2024-08-26 PROCEDURE — 85027 COMPLETE CBC AUTOMATED: CPT

## 2024-08-26 PROCEDURE — 82330 ASSAY OF CALCIUM: CPT

## 2024-08-26 RX ORDER — MECLIZINE HYDROCHLORIDE 25 MG/1
1 TABLET ORAL
Qty: 15 | Refills: 0
Start: 2024-08-26 | End: 2024-08-30

## 2024-08-26 RX ADMIN — VALSARTAN 20 MILLIGRAM(S): 40 TABLET ORAL at 05:37

## 2024-08-26 RX ADMIN — Medication 40 MILLIGRAM(S): at 05:37

## 2024-08-26 RX ADMIN — TICAGRELOR 90 MILLIGRAM(S): 90 TABLET ORAL at 05:37

## 2024-08-26 RX ADMIN — Medication 81 MILLIGRAM(S): at 11:47

## 2024-08-26 RX ADMIN — CARVEDILOL 6.25 MILLIGRAM(S): 6.25 TABLET ORAL at 08:51

## 2024-08-26 NOTE — DISCHARGE NOTE NURSING/CASE MANAGEMENT/SOCIAL WORK - NSDCPEFALRISK_GEN_ALL_CORE
For information on Fall & Injury Prevention, visit: [email or web address] OR  [email or web address] OR  https://www.cdc.gov/steadi/patient.html
no concerns

## 2024-08-26 NOTE — DISCHARGE NOTE PROVIDER - NSDCCPCAREPLAN_GEN_ALL_CORE_FT
PRINCIPAL DISCHARGE DIAGNOSIS  Diagnosis: Vertigo  Assessment and Plan of Treatment: Continue Meclizine as needed.   Follow up with Dr. Gonzalez, Neurology, in 1 week to schedule outpatient  Open MRI. and an MRA of Head and neck.

## 2024-08-26 NOTE — PROGRESS NOTE ADULT - SUBJECTIVE AND OBJECTIVE BOX
Neurology Progress Note    S: Patient seen and examined. No new events overnight. patient denied CP, SOB, HA or pain.  better. son at bedside     Medication:  aspirin enteric coated 81 milliGRAM(s) Oral daily  carvedilol 6.25 milliGRAM(s) Oral every 12 hours  meclizine 25 milliGRAM(s) Oral every 8 hours PRN  pantoprazole    Tablet 40 milliGRAM(s) Oral before breakfast  rosuvastatin 20 milliGRAM(s) Oral at bedtime  ticagrelor 90 milliGRAM(s) Oral every 12 hours  valsartan 20 milliGRAM(s) Oral daily      Vitals:  Vital Signs Last 24 Hrs  T(C): 37 (26 Aug 2024 08:48), Max: 37 (26 Aug 2024 08:48)  T(F): 98.6 (26 Aug 2024 08:48), Max: 98.6 (26 Aug 2024 08:48)  HR: 58 (26 Aug 2024 08:48) (52 - 58)  BP: 142/83 (26 Aug 2024 08:48) (101/63 - 142/83)  BP(mean): 92 (25 Aug 2024 20:58) (92 - 92)  RR: 18 (26 Aug 2024 08:48) (17 - 18)  SpO2: 98% (26 Aug 2024 08:48) (98% - 98%)    Parameters below as of 26 Aug 2024 08:48  Patient On (Oxygen Delivery Method): room air    Orthostatic VS    08-25-24 @ 11:40  Lying BP: Orthostatic BP (Lying Systolic): 121/Orthostatic BP (Lying Diastolic (mm Hg)): 80 HR: Orthostatic Pulse (Heart Rate (beats/min)): 58   Sitting BP: Orthostatic BP (Sitting Systolic): 126/Orthostatic BP (Sitting Diastolic (mm Hg)): 84 HR: Orthostatic Pulse (Heart Rate (beats/min)): 63  Standing BP: Orthostatic BP (Standing Systolic): 101/Orthostatic BP (Standing Diastolic (mm Hg)): 70 HR: --  Site: Orthostatic BP/Pulse (Site): upper right arm   Mode: Orthostatic BP/ Pulse (Mode): electronic    08-24-24 @ 14:33  Lying BP: Orthostatic BP (Lying Systolic): 160/Orthostatic BP (Lying Diastolic (mm Hg)): 80 HR: Orthostatic Pulse (Heart Rate (beats/min)): 61   Sitting BP: --/-- HR: --  Standing BP: Orthostatic BP (Standing Systolic): 178/Orthostatic BP (Standing Diastolic (mm Hg)): 103 HR: Orthostatic Pulse (Heart Rate (beats/min)): 78  Site: Orthostatic BP/Pulse (Site): upper right arm   Mode: Orthostatic BP/ Pulse (Mode): electronic      General Exam:   General Appearance: Appropriately dressed and in no acute distress       Head: Normocephalic, atraumatic and no dysmorphic features  Ear, Nose, and Throat: Moist mucous membranes  CVS: S1S2+  Resp: No SOB, no wheeze or rhonchi  Abd: soft NTND  Extremities: No edema, no cyanosis  Skin: No bruises, no rashes     Neurological Exam:  Mental Status: Awake, alert and oriented x 3.  Able to follow simple and complex verbal commands. Able to name and repeat. fluent speech. No obvious aphasia or dysarthria noted.   Cranial Nerves: PERRL, EOMI, VFFC, sensation V1-V3 intact,  no obvious facial asymmetry , equal elevation of palate, scm/trap 5/5, tongue is midline on protrusion. no obvious papilledema on fundoscopic exam. Hearing is grossly intact.   Motor: Normal bulk, tone and strength throughout. Fine finger movements were intact and symmetric. no tremors or drift noted.    Sensation: Intact to light touch and pinprick throughout. no right/left confusion. no extinction to tactile on DSS. Romberg was negative.   Reflexes: 1+ throughout at biceps, brachioradialis, triceps, patellars and ankles bilaterally and equal. No clonus. R toe and L toe were both downgoing.  Coordination: No dysmetria on FNF or HKS  Gait: Narrow based and steady. Able to tandem. no limitations in gait.     I personally reviewed the below data/images/labs:      CBC Full  -  ( 25 Aug 2024 07:22 )  WBC Count : 7.08 K/uL  RBC Count : 4.47 M/uL  Hemoglobin : 12.7 g/dL  Hematocrit : 40.3 %  Platelet Count - Automated : 164 K/uL  Mean Cell Volume : 90.2 fl  Mean Cell Hemoglobin : 28.4 pg  Mean Cell Hemoglobin Concentration : 31.5 gm/dL  Auto Neutrophil # : x  Auto Lymphocyte # : x  Auto Monocyte # : x  Auto Eosinophil # : x  Auto Basophil # : x  Auto Neutrophil % : x  Auto Lymphocyte % : x  Auto Monocyte % : x  Auto Eosinophil % : x  Auto Basophil % : x    08-25    142  |  108  |  17  ----------------------------<  102<H>  3.7   |  21<L>  |  0.64    Ca    9.1      25 Aug 2024 07:25    TPro  7.2  /  Alb  3.8  /  TBili  0.3  /  DBili  x   /  AST  18  /  ALT  13  /  AlkPhos  66  08-25    LIVER FUNCTIONS - ( 25 Aug 2024 07:25 )  Alb: 3.8 g/dL / Pro: 7.2 g/dL / ALK PHOS: 66 U/L / ALT: 13 U/L / AST: 18 U/L / GGT: x           PT/INR - ( 24 Aug 2024 11:08 )   PT: 11.1 sec;   INR: 1.06 ratio         PTT - ( 24 Aug 2024 11:08 )  PTT:32.0 sec  Urinalysis Basic - ( 25 Aug 2024 07:25 )    Color: x / Appearance: x / SG: x / pH: x  Gluc: 102 mg/dL / Ketone: x  / Bili: x / Urobili: x   Blood: x / Protein: x / Nitrite: x   Leuk Esterase: x / RBC: x / WBC x   Sq Epi: x / Non Sq Epi: x / Bacteria: x    < from: CT Brain Stroke Protocol (08.24.24 @ 11:21) >    ACC: [id number] EXAM:  CT BRAIN STROKE PROTOCOL   ORDERED BY:  PHILL MCLAUGHLIN     PROCEDURE DATE:  08/24/2024          INTERPRETATION:  CLINICAL INFORMATION: Dizziness starting yesterday, LKW   11:00 PM. No vertigo. NIHSS 0.    COMPARISON: None.    CONTRAST:  IV Contrast: None.  Complications: None reported at time of study completion.    TECHNIQUE:  Serial axial images were obtained from the skull base to the   vertex using multi-slice helical technique. Sagittal and coronal   reformats were obtained.    FINDINGS:    VENTRICLES AND SULCI: Mild atrophy.  INTRA-AXIAL: Small vessel white matter ischemic changes.  EXTRA-AXIAL: No mass or fluid collection. Basal cisterns are normal in   appearance.    VISUALIZED SINUSES:  Mucosal thickening in the left maxillary and   bilateral ethmoid sinuses.  TYMPANOMASTOID CAVITIES:  Clear.  VISUALIZED ORBITS: Normal.  CALVARIUM: Intact.    MISCELLANEOUS: None.      IMPRESSION:  No acute intracranial hemorrhage, mass effect, or midline shift. Mild   atrophy and small vessel white matter ischemic changes.    Findings were discussed with Dr. Fine on 8/24/2024 at 11:13 AM by Dr. Gray with read back confirmation.    --- End of Report ---           AUDRA GRAY MD; Resident Radiologist  This document has been electronically signed.  HUA DUBOIS MD; Attending Radiologist  This document has been electronically signed. Aug 24 2024 11:24AM    < end of copied text >  < from: TTE Limited W or WO Ultrasound Enhancing Agent (08.26.24 @ 07:34) >    TRANSTHORACIC ECHOCARDIOGRAM REPORT  ________________________________________________________________________________                                      _______       Pt. Name:       TRAVIS BURNS Study Date:    8/26/2024  MRN:            [id number]   YOB: 1960  Accession #:    [id number]    Age:           64 years  Account#:       [id number] Gender:        F  Heart Rate:     61 bpm       Height:        65.00 in (165.10 cm)  Rhythm:                      Weight:        179.00 lb (81.19 kg)  Blood Pressure: 120/80 mmHg  BSA/BMI:       1.89 m² / 29.79 kg/m²  ________________________________________________________________________________________  Referring Physician:    [id number] Jesse Forman  Interpreting Physician: Christine Wesley  Primary Sonographer:    Shani Urena [facility]    CPT:                ECHO TTE WITH CON COMP W DOPP - .m;DEFINITY ECHO                      CONTRAST PER ML - .m;DEFINITY ECHO CONTRAST PER ML                      WASTED - .m  Indication(s):      Syncope and collapse - R55  Procedure:          Transthoracic echocardiogram with 2-D, M-mode and complete                      spectral and color flow Doppler.  Ordering Location:  [facility]  Admission Status:   Inpatient  Contrast Injection: Verbal consent was obtained for injection of Ultrasonic                      Enhancing Agent following a discussion of risks and                      benefits.                      Endocardial visualization enhanced with 2 ml of Definity               Ultrasound enhancing agent (Lot#:[id number] Exp.Date:06/2025                      Discarded Dose:8ml).  UEA Reaction:       Patient had no adverse reaction after injection of                      Ultrasound Enhancing Agent.  Study Information: Image quality for this study is fair.    _______________________________________________________________________________________     CONCLUSIONS:      1. Left ventricular systolic function is mildly decreased with an ejection fraction of 50 % by Vu's method of disks. Regional wall motion abnormalities present.   2. There is mild (grade 1) left ventricular diastolic dysfunction.   3. Normal right ventricular systolic function.   4. Mild mitral regurgitation.   5. Compared to the transthoracic echocardiogram performed on 3/16/2024, contrast was administered on this study. The apex is better visualized and appears hypokinetic.    ________________________________________________________________________________________  FINDINGS:     Left Ventricle:  The left ventricular cavity is normal in size. Left ventricular wall thickness is normal. Left ventricular systolic function is mildly decreased with a calculated ejection fraction of 50 % by the Vu's biplane method of disks. There are regional wall motion abnormalities present. Left ventricular regional wall motion assessment reveals hypokinesis of the apical, mid inferoseptal and anterior walls. There is mild (grade 1) left ventricular diastolic dysfunction. There is no evidence of a left ventricular thrombus.     Right Ventricle:  The right ventricular cavity is normal in size and right ventricular systolic function is normal. Tricuspid annular plane systolic excursion (TAPSE) is 1.7 cm (normal >=1.7 cm).     Left Atrium:  The left atrium is normal in size with an indexed volume of 29.68 ml/m².     Right Atrium:  The right atrium is normal in size with an indexed volume of 22.26 ml/m².     Aortic Valve:  The aortic valve appears trileaflet. There is focal calcification ofthe aortic valve leaflets. There is mild thickening of the aortic valve leaflets. There is no aortic valve stenosis. There is no evidence of aortic regurgitation.     Mitral Valve:  Thickened mitral valve leaflets. There is no mitral valve stenosis. There is mild mitral regurgitation.     Tricuspid Valve:  There is trace tricuspid regurgitation. Estimated pulmonary artery systolic pressure is 31 mmHg. Grossly normal leaflets.     Pulmonic Valve:  The pulmonic valve was not well visualized. There is no pulmonic valve stenosis. There is trace pulmonic regurgitation.     Aorta:  The aortic root at the sinuses of Valsalva is normal in size, measuring 3.00 cm (indexed 1.59 cm/m²). The ascending aorta is normal in size, measuring 3.50 cm (indexed 1.85 cm/m²). The aortic arch diameter is normal in size, measuring 3.2 cm (indexed 1.70 cm/m²).     Pericardium:  No pericardial effusion seen.     Systemic Veins:  The inferior vena cava is normal in size measuring 1.20 cm in diameter, (normal <2.1cm)with normal inspiratory collapse (normal >50%) consistent with normal right atrial pressure (~3, range 0-5mmHg).  ____________________________________________________________________  QUANTITATIVE DATA:  Left Ventricle Measurements: (Indexed to BSA)     IVSd (2D):   0.7 cm  LVPWd (2D):  0.7 cm  LVIDd (2D):  5.1 cm  LVIDs (2D):  3.4 cm  LV Mass:     119 g  63.1 g/m²  BiPlane LV EF%: 50 %     MV E Vmax:    0.94 m/s  MV A Vmax:    0.67 m/s  MV E/A:       1.40  e' lateral:   7.51 cm/s  e' medial:    6.85 cm/s  E/e' lateral: 12.49  E/e' medial:  13.69  E/e' Average: 13.06    Aorta Measurements: (Normal range) (Indexed to BSA)     Ao Root d     3.00 cm (2.7 - 3.3 cm) 1.59 cm/m²  Ao Asc:       3.5 cm  Ao Asc d, 2D: 3.60  Ao Asc prox:  3.50 cm        1.85 cm/m²  Ao Arch:      3.2 cm            Left Atrium Measurements: (Indexed to BSA)  LA Diam 2D: 4.00 cm         Right Ventricle Measurements: Right Atrial Measurements:     TAPSE:           1.7 cm       RA Vol:       42.00 ml  RV S' Vmax:     10.80 cm/s   RA Vol Index: 22.26 ml/m²  RV Base (RVID1): 3.0 cm  RV Mid (RVID2):  3.0 cm       LVOT / RVOT/ Qp/Qs Data: (Indexed to BSA)  LVOT Diameter:  1.80 cm  LVOT Area:      2.54 cm²  LVOT Vmax:      0.86 m/s  LVOT Vmn:       0.536 m/s  LVOT VTI:       18.40 cm  LVOT peak grad: 3 mmHg  LVOT mean grad: 1.0 mmHg  LVOT SV:        46.8 ml   24.81 ml/m²    Aortic Valve Measurements:  AV Vmax:                1.2 m/s  AV Peak Gradient:       5.3 mmHg  AV Mean Gradient:       3.0 mmHg  AV VTI:               25.1 cm  AV VTI Ratio:           0.73  AoV EOA, Contin:        1.87 cm²  AoV EOA, Contin i:      0.99 cm²/m²  AoV Dimensionless Index 0.73    Mitral Valve Measurements:     MV E Vmax: 0.9 m/s         MR Vmax:          5.92 m/s  MV A Vmax: 0.7 m/s         MR VTI:           224.00 cm  MV E/A:    1.4             MR Mean Gradient: 98.0 mmHg                             MR Peak Gradient: 140.2 mmHg       Tricuspid Valve Measurements:     TR Vmax:          2.6 m/s  TR Peak Gradient: 27.9 mmHg  RA Pressure:      3 mmHg  PASP:             31 mmHg    ________________________________________________________________________________________  Electronically signed on 8/26/2024 at 8:59:53 AM by Christine Wesley         *** Final ***    < end of copied text >

## 2024-08-26 NOTE — PROGRESS NOTE ADULT - REASON FOR ADMISSION
The patient is a 64y Female complaining of dizziness.
The patient is a 64y Female complaining of dizziness.

## 2024-08-26 NOTE — DISCHARGE NOTE PROVIDER - CARE PROVIDER_API CALL
Sukhjinder Gonzalez  Neurology  [address] [facility] [address]  [place] [address]  Phone: [phone]  Fax: [phone]  Established Patient  Follow Up Time: 1 week

## 2024-08-26 NOTE — PROGRESS NOTE ADULT - ASSESSMENT
63 yo F with CAD s/p stent (DAPT) reports to the hospital 2/2 dizziness. Neurology consulted for dizziness. Physical exam shows no focal deficits,. CTH shows no acute abnormalities. CTA deferred 2/2 contrast allergy. Patient states symptoms are resolving.   8/25 back to baseline   TTE as above  orthostatics +     Impression: Transient dizziness described as lightheaded in the setting of nausea and abdominal discomfort, NIHSS 0,   Etiology: Unsure, less likely neurological at this time as no focal neuro deficits, would work up other causes such as hypovolemia or cardiac --> orthostatics +     Recommendations   - MRI brain if symptoms persists.  patient is claustrophobic wants open MRI outpatient   - if back to baseline outpatient MRI brain and MRA H/N   - orthostatics +; trend.  hydration.  compression stockings   - meclizline PRN  - c/w antiplatelet and statin   outpatient /fu on dishcarge   spoke to son at bedside as well   Sukhjinder Gonzalez MD  Vascular Neurology  Office: [phone] .

## 2024-08-26 NOTE — DISCHARGE NOTE PROVIDER - HOSPITAL COURSE
HPI:  64-year-old female past medical history of MI (on aspirin and Brilinta), presents from triage as code stroke.  Patient's last known normal 11 PM last night, patient woke up this morning with severe dizziness, difficulty walking, no change in speech, endorses overall weakness, no decreased sensation upper or lower extremity.  No history of stroke in past.  Patient seen evaluated in triage area with  at bedside who collaborates story.  Neurology at bedside for code stroke consult   (24 Aug 2024 18:15)    Hospital Course:  Neurology consulted for dizziness. Physical exam shows no focal deficits,. CTH shows no acute abnormalities. CTA deferred 2/2 contrast allergy. Patient states symptoms are resolving. 8/25 back to baseline. s/p TTE.  Positive orthostatics but improving.     Transient dizziness described as lightheaded in the setting of nausea and abdominal discomfort, NIHSS 0,   Etiology: Unsure, less likely neurological at this time as no focal neuro deficits, would work up other causes such as hypovolemia or cardiac --> orthostatics +   Pt is unable to do an MRI of brain 2/2 she is claustrophobic wants open MRI outpatient   -Pt is now back to her baseline.  She will obtain an outpatient MRI brain and MRA H/N     Important Medication Changes and Reason:  Meclizine prn   Active or Pending Issues Requiring Follow-up:  Follow up with Dr. Gonzalez in 1 week.     Advanced Directives:   [ X] Full code  [ ] DNR  [ ] Hospice    Discharge Diagnoses:  Transient Dizziness (Vertigo)

## 2024-08-26 NOTE — DISCHARGE NOTE PROVIDER - NSDCMRMEDTOKEN_GEN_ALL_CORE_FT
aspirin 81 mg oral tablet: 1 tab(s) orally once a day  Brilinta (ticagrelor) 90 mg oral tablet: 1 tab(s) orally 2 times a day  carvedilol 6.25 mg oral tablet: 1 tab(s) orally 2 times a day  eplerenone 25 mg oral tablet: 1 tab(s) orally once a day  Farxiga 5 mg oral tablet: 1 tab(s) orally once a day  meclizine 25 mg oral tablet: 1 tab(s) orally every 8 hours as needed for Dizziness MDD: 3  pantoprazole 40 mg oral delayed release tablet: 1 tab(s) orally once a day  rosuvastatin 20 mg oral capsule: 1 cap(s) orally once a day  valsartan 40 mg oral tablet: 0.5 tab(s) orally once a day

## 2024-08-26 NOTE — DISCHARGE NOTE NURSING/CASE MANAGEMENT/SOCIAL WORK - PATIENT PORTAL LINK FT
You can access the FollowMyHealth Patient Portal offered by [facility] by registering at the following website: [email or web address]. By joining [facility]’s FollowMyHealth portal, you will also be able to view your health information using other applications (apps) compatible with our system.

## 2024-08-29 RX ORDER — VALSARTAN 40 MG/1
0.5 TABLET ORAL
Refills: 0 | DISCHARGE

## 2024-08-29 RX ORDER — TICAGRELOR 90 MG/1
1 TABLET ORAL
Refills: 0 | DISCHARGE

## 2024-08-29 RX ORDER — ROSUVASTATIN CALCIUM 10 MG/1
1 TABLET ORAL
Refills: 0 | DISCHARGE

## 2024-08-29 RX ORDER — ASPIRIN 81 MG
1 TABLET, DELAYED RELEASE (ENTERIC COATED) ORAL
Refills: 0 | DISCHARGE

## 2024-08-29 RX ORDER — EPLERENONE 50 MG/1
1 TABLET, COATED ORAL
Refills: 0 | DISCHARGE

## 2024-08-29 RX ORDER — DAPAGLIFLOZIN 10 MG/1
1 TABLET, FILM COATED ORAL
Refills: 0 | DISCHARGE

## 2024-08-29 RX ORDER — CARVEDILOL 6.25 MG/1
1 TABLET ORAL
Refills: 0 | DISCHARGE

## 2024-08-29 RX ORDER — PANTOPRAZOLE SODIUM 40 MG
1 TABLET, DELAYED RELEASE (ENTERIC COATED) ORAL
Refills: 0 | DISCHARGE

## 2024-09-24 ENCOUNTER — NON-APPOINTMENT (OUTPATIENT)
Age: 64
End: 2024-09-24

## 2024-09-25 ENCOUNTER — NON-APPOINTMENT (OUTPATIENT)
Age: 64
End: 2024-09-25

## 2024-09-25 ENCOUNTER — APPOINTMENT (OUTPATIENT)
Dept: CARDIOLOGY | Facility: CLINIC | Age: 64
End: 2024-09-25

## 2024-09-25 VITALS
HEIGHT: 65 IN | WEIGHT: 180 LBS | HEART RATE: 59 BPM | BODY MASS INDEX: 29.99 KG/M2 | SYSTOLIC BLOOD PRESSURE: 129 MMHG | OXYGEN SATURATION: 98 % | DIASTOLIC BLOOD PRESSURE: 78 MMHG

## 2024-09-25 PROCEDURE — 99214 OFFICE O/P EST MOD 30 MIN: CPT | Mod: 25

## 2024-09-25 PROCEDURE — 93000 ELECTROCARDIOGRAM COMPLETE: CPT

## 2024-10-14 NOTE — PHYSICAL THERAPY INITIAL EVALUATION ADULT - ORIENTATION, REHAB EVAL
oriented to person, place, time and situation
Continue Regimen: tacrolimus 0.1 % topical ointment twice daily, as needed\\nQuantity: 30.0 g  Days Supply: 30\\nSig: Apply to AA on the eyelids  twice daily x 2 weeks, then once daily x 1 week, then prn active flares.
Render In Strict Bullet Format?: No
Detail Level: Zone

## 2025-02-12 ENCOUNTER — APPOINTMENT (OUTPATIENT)
Dept: CARDIOLOGY | Facility: CLINIC | Age: 65
End: 2025-02-12

## 2025-02-12 VITALS
HEART RATE: 72 BPM | SYSTOLIC BLOOD PRESSURE: 145 MMHG | OXYGEN SATURATION: 98 % | DIASTOLIC BLOOD PRESSURE: 85 MMHG | WEIGHT: 179 LBS | BODY MASS INDEX: 29.79 KG/M2

## 2025-02-12 PROCEDURE — 93000 ELECTROCARDIOGRAM COMPLETE: CPT

## 2025-02-12 PROCEDURE — 99214 OFFICE O/P EST MOD 30 MIN: CPT | Mod: 25

## 2025-02-24 ENCOUNTER — APPOINTMENT (OUTPATIENT)
Dept: CARDIOLOGY | Facility: CLINIC | Age: 65
End: 2025-02-24

## 2025-03-12 ENCOUNTER — APPOINTMENT (OUTPATIENT)
Dept: CARDIOLOGY | Facility: CLINIC | Age: 65
End: 2025-03-12

## 2025-04-08 ENCOUNTER — APPOINTMENT (OUTPATIENT)
Dept: OPHTHALMOLOGY | Facility: CLINIC | Age: 65
End: 2025-04-08

## 2025-04-08 ENCOUNTER — NON-APPOINTMENT (OUTPATIENT)
Age: 65
End: 2025-04-08

## 2025-04-08 PROCEDURE — 92133 CPTRZD OPH DX IMG PST SGM ON: CPT

## 2025-04-08 PROCEDURE — 92014 COMPRE OPH EXAM EST PT 1/>: CPT | Mod: 25

## 2025-04-19 ENCOUNTER — NON-APPOINTMENT (OUTPATIENT)
Age: 65
End: 2025-04-19

## 2025-04-21 ENCOUNTER — APPOINTMENT (OUTPATIENT)
Dept: GASTROENTEROLOGY | Facility: CLINIC | Age: 65
End: 2025-04-21

## 2025-06-12 ENCOUNTER — NON-APPOINTMENT (OUTPATIENT)
Age: 65
End: 2025-06-12

## 2025-06-12 ENCOUNTER — APPOINTMENT (OUTPATIENT)
Dept: GASTROENTEROLOGY | Facility: CLINIC | Age: 65
End: 2025-06-12
Payer: MEDICARE

## 2025-06-12 VITALS
WEIGHT: 179 LBS | DIASTOLIC BLOOD PRESSURE: 94 MMHG | HEIGHT: 65 IN | HEART RATE: 66 BPM | SYSTOLIC BLOOD PRESSURE: 142 MMHG | BODY MASS INDEX: 29.82 KG/M2

## 2025-06-12 PROCEDURE — 99203 OFFICE O/P NEW LOW 30 MIN: CPT

## 2025-06-12 PROCEDURE — G2211 COMPLEX E/M VISIT ADD ON: CPT

## 2025-06-12 RX ORDER — SIMETHICONE CHEW TAB 80 MG 80 MG
TABLET ORAL
Refills: 0 | Status: ACTIVE | COMMUNITY

## 2025-07-02 ENCOUNTER — NON-APPOINTMENT (OUTPATIENT)
Age: 65
End: 2025-07-02

## 2025-07-02 ENCOUNTER — APPOINTMENT (OUTPATIENT)
Dept: CARDIOLOGY | Facility: CLINIC | Age: 65
End: 2025-07-02

## 2025-07-02 VITALS
BODY MASS INDEX: 29.82 KG/M2 | DIASTOLIC BLOOD PRESSURE: 87 MMHG | SYSTOLIC BLOOD PRESSURE: 144 MMHG | HEIGHT: 65 IN | WEIGHT: 179 LBS | HEART RATE: 67 BPM | OXYGEN SATURATION: 99 %

## 2025-07-02 PROCEDURE — 93000 ELECTROCARDIOGRAM COMPLETE: CPT

## 2025-07-02 PROCEDURE — 99204 OFFICE O/P NEW MOD 45 MIN: CPT | Mod: 25
